# Patient Record
Sex: FEMALE | Race: WHITE | NOT HISPANIC OR LATINO | Employment: OTHER | URBAN - METROPOLITAN AREA
[De-identification: names, ages, dates, MRNs, and addresses within clinical notes are randomized per-mention and may not be internally consistent; named-entity substitution may affect disease eponyms.]

---

## 2017-10-18 ENCOUNTER — OFFICE VISIT (OUTPATIENT)
Dept: INTERNAL MEDICINE | Facility: MEDICAL CENTER | Age: 52
End: 2017-10-18
Payer: COMMERCIAL

## 2017-10-18 VITALS
HEIGHT: 70 IN | OXYGEN SATURATION: 96 % | BODY MASS INDEX: 30.24 KG/M2 | DIASTOLIC BLOOD PRESSURE: 88 MMHG | SYSTOLIC BLOOD PRESSURE: 136 MMHG | WEIGHT: 211.25 LBS | HEART RATE: 89 BPM | TEMPERATURE: 97.5 F

## 2017-10-18 DIAGNOSIS — R06.02 SOB (SHORTNESS OF BREATH): ICD-10-CM

## 2017-10-18 DIAGNOSIS — G62.9 NEUROPATHY: ICD-10-CM

## 2017-10-18 DIAGNOSIS — R56.9 SEIZURE (HCC): ICD-10-CM

## 2017-10-18 DIAGNOSIS — E66.9 OBESITY (BMI 30.0-34.9): ICD-10-CM

## 2017-10-18 DIAGNOSIS — Z72.0 TOBACCO USE: ICD-10-CM

## 2017-10-18 DIAGNOSIS — G89.29 CHRONIC LEFT-SIDED LOW BACK PAIN WITHOUT SCIATICA: ICD-10-CM

## 2017-10-18 DIAGNOSIS — Z87.898 HISTORY OF PREDIABETES: ICD-10-CM

## 2017-10-18 DIAGNOSIS — M54.2 NECK PAIN: ICD-10-CM

## 2017-10-18 DIAGNOSIS — M54.50 CHRONIC LEFT-SIDED LOW BACK PAIN WITHOUT SCIATICA: ICD-10-CM

## 2017-10-18 DIAGNOSIS — R05.9 COUGH: ICD-10-CM

## 2017-10-18 DIAGNOSIS — J45.30 MILD PERSISTENT ASTHMA WITHOUT COMPLICATION: ICD-10-CM

## 2017-10-18 DIAGNOSIS — Z00.00 HEALTHCARE MAINTENANCE: ICD-10-CM

## 2017-10-18 DIAGNOSIS — F31.9 BIPOLAR AFFECTIVE DISORDER, REMISSION STATUS UNSPECIFIED (HCC): ICD-10-CM

## 2017-10-18 DIAGNOSIS — I82.591 CHRONIC DEEP VEIN THROMBOSIS (DVT) OF OTHER VEIN OF RIGHT LOWER EXTREMITY (HCC): ICD-10-CM

## 2017-10-18 DIAGNOSIS — F43.10 PTSD (POST-TRAUMATIC STRESS DISORDER): ICD-10-CM

## 2017-10-18 PROBLEM — E66.811 OBESITY (BMI 30.0-34.9): Status: ACTIVE | Noted: 2017-10-18

## 2017-10-18 PROBLEM — I82.501 CHRONIC DEEP VEIN THROMBOSIS (DVT) OF RIGHT LOWER EXTREMITY (HCC): Status: ACTIVE | Noted: 2017-10-18

## 2017-10-18 LAB
HBA1C MFR BLD: 5.4 % (ref ?–5.8)
INT CON NEG: NEGATIVE
INT CON POS: POSITIVE

## 2017-10-18 PROCEDURE — 99999 POCT A1C: CPT | Mod: GC | Performed by: INTERNAL MEDICINE

## 2017-10-18 PROCEDURE — 83036 HEMOGLOBIN GLYCOSYLATED A1C: CPT | Performed by: INTERNAL MEDICINE

## 2017-10-18 PROCEDURE — 99204 OFFICE O/P NEW MOD 45 MIN: CPT | Mod: GC | Performed by: INTERNAL MEDICINE

## 2017-10-18 RX ORDER — NICOTINE 21 MG/24HR
1 PATCH, TRANSDERMAL 24 HOURS TRANSDERMAL EVERY 24 HOURS
Qty: 30 PATCH | Refills: 3 | Status: SHIPPED | OUTPATIENT
Start: 2017-10-18 | End: 2017-10-18 | Stop reason: SDUPTHER

## 2017-10-18 RX ORDER — GABAPENTIN 600 MG/1
800 TABLET ORAL 3 TIMES DAILY
Qty: 90 TAB | Refills: 6 | Status: SHIPPED | OUTPATIENT
Start: 2017-10-18 | End: 2020-08-04

## 2017-10-18 RX ORDER — LAMOTRIGINE 200 MG/1
200 TABLET ORAL DAILY
Qty: 30 TAB | Refills: 6 | Status: SHIPPED | OUTPATIENT
Start: 2017-10-18 | End: 2020-08-04

## 2017-10-18 RX ORDER — ASPIRIN 81 MG/1
81 TABLET, CHEWABLE ORAL DAILY
Qty: 100 TAB | Refills: 6 | Status: SHIPPED | OUTPATIENT
Start: 2017-10-18 | End: 2020-08-04

## 2017-10-18 RX ORDER — ALBUTEROL SULFATE 90 UG/1
2 AEROSOL, METERED RESPIRATORY (INHALATION) EVERY 6 HOURS PRN
Qty: 8.5 G | Refills: 6 | Status: SHIPPED | OUTPATIENT
Start: 2017-10-18

## 2017-10-18 RX ORDER — BACLOFEN 10 MG/1
10 TABLET ORAL 3 TIMES DAILY
Qty: 90 TAB | Refills: 3 | Status: SHIPPED | OUTPATIENT
Start: 2017-10-18 | End: 2018-03-05 | Stop reason: SDUPTHER

## 2017-10-18 RX ORDER — NICOTINE 21 MG/24HR
1 PATCH, TRANSDERMAL 24 HOURS TRANSDERMAL EVERY 24 HOURS
Qty: 30 PATCH | Refills: 3 | Status: SHIPPED | OUTPATIENT
Start: 2017-10-18 | End: 2020-08-04

## 2017-10-18 ASSESSMENT — PATIENT HEALTH QUESTIONNAIRE - PHQ9
SUM OF ALL RESPONSES TO PHQ QUESTIONS 1-9: 8
CLINICAL INTERPRETATION OF PHQ2 SCORE: 1
5. POOR APPETITE OR OVEREATING: 0 - NOT AT ALL

## 2017-10-18 NOTE — PATIENT INSTRUCTIONS
Post-traumatic Stress  You have post-traumatic stress disorder (PTSD). This condition causes many different symptoms including: emotional outbursts, anxiety, sleeping problems, social withdrawal, and drug abuse. PTSD often follows a particularly traumatic event such as war, or natural disasters like hurricanes, earthquakes, or floods. It can also be seen after personal traumas such as accidents, rape, or the death of someone you love. Symptoms may be delayed for days or even years. Emotional numbing and the inability to feel your emotions, may be the earliest sign. Periods of agitation, aggression, and inability to perform ordinary tasks are common with PTSD.  Nightmares and daytime memories of the trauma often bring on uncontrolled symptoms. Sufferers typically startle easily and avoid reminders of the trauma. Panic attacks, feelings of extreme guilt, and blackouts are often reported. Treatment is very helpful, especially group therapy. Healing happens when emotional traumas are shared with others who have a sympathetic ear. The [facility] have helped over 185,000 veterans with this problem. Medication is also very effective. The symptoms can become chronic and lifelong, so it is important to get help. Call your caregiver or a counselor who deals with this type of problem for further assistance.  Document Released: 01/25/2006 Document Revised: 03/11/2013 Document Reviewed: 12/18/2006  [employer]® Patient Information ©2014 [employer].
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
## 2017-10-18 NOTE — LETTER
[facility]  Nabeel Lees M.D.  [address]  [place] [address]  Fax: [phone]   Authorization for Release/Disclosure of   Protected Health Information   Name: DALIATRAM GÓMEZ SHANTAON ARIA : 1965 SSN: [id number]   Address: [address] [place] [address] [address] [place] [address] Phone:    [phone] (home)    I authorize the entity listed below to release/disclose the PHI below to:   [facility]/Nabeel Lees M.D. and Nabeel Lees M.D.   Provider or Entity Name: [facility]     Address   City, State, Zip   Phone:[phone]      Fax:[phone]     Reason for request: continuity of care   Information to be released: Last 3 Lab Results   [  ] LAST COLONOSCOPY,  including any PATH REPORT and follow-up  [  ] LAST FIT/COLOGUARD RESULT [  ] LAST DEXA  [  ] LAST MAMMOGRAM  [  ] LAST PAP  [  ] LAST LABS [  ] RETINA EXAM REPORT  [  ] IMMUNIZATION RECORDS  [  ] Release all info      [  ] Check here and initial the line next to each item to release ALL health information INCLUDING  _____ Care and treatment for drug and / or alcohol abuse  _____ HIV testing, infection status, or AIDS  _____ Genetic Testing    DATES OF SERVICE OR TIME PERIOD TO BE DISCLOSED: _____________  I understand and acknowledge that:  * This Authorization may be revoked at any time by you in writing, except if your health information has already been used or disclosed.  * Your health information that will be used or disclosed as a result of you signing this authorization could be re-disclosed by the recipient. If this occurs, your re-disclosed health information may no longer be protected by State or Federal laws.  * You may refuse to sign this Authorization. Your refusal will not affect your ability to obtain treatment.  * This Authorization becomes effective upon signing and will  on (date) __________.      If no date is indicated, this Authorization will  one (1) year from the signature date.    Name: Dalia Smith  Yuliya    Signature:   Date:     10/18/2017       PLEASE FAX REQUESTED RECORDS BACK TO: [phone]

## 2017-10-30 ENCOUNTER — APPOINTMENT (OUTPATIENT)
Dept: INTERNAL MEDICINE | Facility: MEDICAL CENTER | Age: 52
End: 2017-10-30
Payer: COMMERCIAL

## 2017-11-16 ENCOUNTER — HOSPITAL ENCOUNTER (OUTPATIENT)
Dept: RADIOLOGY | Facility: MEDICAL CENTER | Age: 52
End: 2017-11-16
Attending: INTERNAL MEDICINE
Payer: COMMERCIAL

## 2017-11-16 PROCEDURE — G0202 SCR MAMMO BI INCL CAD: HCPCS

## 2017-11-20 NOTE — PROGRESS NOTES
Pt's mammogram on 11/16/2017 showed No gross evidence of malignancy and no interval change.    Thanks

## 2017-11-27 ENCOUNTER — TELEPHONE (OUTPATIENT)
Dept: INTERNAL MEDICINE | Facility: MEDICAL CENTER | Age: 52
End: 2017-11-27

## 2017-11-28 NOTE — TELEPHONE ENCOUNTER
Called Dr. Celaya's office to make sure of this information the patient gave. Spoke to someone in the  I think by the name of brittaney and she let me know they do take [place] but their panel is full and can not take any new patient's at this time. Patient will need a new referral placed to go somewhere else

## 2017-11-28 NOTE — TELEPHONE ENCOUNTER
----- Message from Tasia Shaikh, Med Ass't sent at 11/27/2017  3:31 PM PST -----  Regarding: Dr. Lees-new referral  Contact: [phone]  Dr. Celaya does not take her insurance, so can we redo the referral-Thanks Mary

## 2017-11-30 ENCOUNTER — TELEPHONE (OUTPATIENT)
Dept: HOSPITALIST | Facility: MEDICAL CENTER | Age: 52
End: 2017-11-30

## 2017-11-30 DIAGNOSIS — E66.9 OBESITY (BMI 30.0-34.9): ICD-10-CM

## 2017-11-30 DIAGNOSIS — F31.9 BIPOLAR AFFECTIVE DISORDER, REMISSION STATUS UNSPECIFIED (HCC): ICD-10-CM

## 2018-01-29 ENCOUNTER — OFFICE VISIT (OUTPATIENT)
Dept: INTERNAL MEDICINE | Facility: MEDICAL CENTER | Age: 53
End: 2018-01-29
Payer: COMMERCIAL

## 2018-01-29 VITALS
BODY MASS INDEX: 31.25 KG/M2 | WEIGHT: 218.25 LBS | OXYGEN SATURATION: 96 % | SYSTOLIC BLOOD PRESSURE: 138 MMHG | HEIGHT: 70 IN | DIASTOLIC BLOOD PRESSURE: 92 MMHG | HEART RATE: 79 BPM | TEMPERATURE: 97.7 F

## 2018-01-29 DIAGNOSIS — Z12.4 CERVICAL CANCER SCREENING: ICD-10-CM

## 2018-01-29 PROCEDURE — 99000 SPECIMEN HANDLING OFFICE-LAB: CPT | Performed by: INTERNAL MEDICINE

## 2018-01-29 NOTE — PATIENT INSTRUCTIONS
Pap Test  A Pap test checks the cells on the surface of your cervix. Your doctor will look for cell changes that are not normal, an infection, or cancer. If the cells no longer look normal, it is called dysplasia. Dysplasia can turn into cancer. Regular Pap tests are important to stop cancer from developing.  BEFORE THE PROCEDURE  · Ask your doctor when to schedule your Pap test. Timing the test around your period may be important.  · Do not douche or have sex (intercourse) for 24 hours before the test.  · Do not put creams on your vagina or use tampons for 24 hours before the test.  · Go pee (urinate) just before the test.  PROCEDURE  · You will lie on an exam table with your feet in stirrups.  · A warm metal or plastic tool (speculum) will be put in your vagina to open it up.  · Your doctor will use a small, plastic brush or wooden spatula to take cells from your cervix.  · The cells will be put in a lab container.  · The cells will be checked under a microscope to see if they are normal or not.  AFTER THE PROCEDURE  Get your test results. If they are abnormal, you may need more tests.  Document Released: 01/20/2012 Document Revised: 03/11/2013 Document Reviewed: 12/13/2012  [employer]® Patient Information ©2014 [employer].

## 2018-01-29 NOTE — PROGRESS NOTES
Established Patient    Wally presents today with the following:    CC: is here for Pap smear    HPI:   Ms. Sarah Dwyer is a 53 yo F with PMHx of PTSD, Bipolar, depression, Seizure, DVT, chronic back and neck pain, peripheral neuropathy, bronchial asthma and prediabetes is here for Pap smear procedure. Pt reported that she has done Pap smear 3 yr ago and was normal. Pt denies any vaginal discharge, bleeding,  itching, smelling, valvular skin rash, swelling, fever or chills. Pt denies any hx of cervical disorders. Pt underwent pap smear procedure today under supervision of Dr. Cannon. Procedure went well and w/o any acute complications. Cervical region was visualized, no polyps, mass, discharge or bleeding was noticed. Sample sent to the lab with thin prep with HPV reflex to genotype  Pt had Mammogram 11/2017 and result was no gross evidence of malignancy and recommend to have screening mammogram in one year     Patient Active Problem List    Diagnosis Date Noted   • Cervical cancer screening 01/29/2018   • Mild persistent asthma without complication 10/18/2017   • PTSD (post-traumatic stress disorder) 10/18/2017   • Neuropathy (CMS-HCC) 10/18/2017   • Chronic deep vein thrombosis (DVT) of right lower extremity (CMS-HCC) 10/18/2017   • Tobacco use 10/18/2017   • Obesity (BMI 30.0-34.9) 10/18/2017   • Healthcare maintenance 10/18/2017   • History of prediabetes 10/18/2017   • Seizure (CMS-HCC) 10/18/2017   • Neck pain 11/16/2012   • Back pain 11/16/2012   • Bipolar affective (CMS-HCC) 11/16/2012       Current Outpatient Prescriptions   Medication Sig Dispense Refill   • Multiple Vitamins-Minerals (MULTIVITAMIN ADULTS PO) Take  by mouth.     • Omega-3 Fatty Acids (FISH OIL PO) Take  by mouth.     • Probiotic Product (PROBIOTIC PO) Take  by mouth.     • RASPBERRY KETONES PO Take  by mouth.     • nicotine (NICODERM) 14 MG/24HR PATCH 24 HR Apply 1 Patch to skin as directed every 24 hours. 30 Patch 3   • aspirin (ASA) 81  MG Chew Tab chewable tablet Take 1 Tab by mouth every day. 100 Tab 6   • gabapentin (NEURONTIN) 600 MG tablet Take 1.5 Tabs by mouth 3 times a day. 90 Tab 6   • lamotrigine (LAMICTAL) 200 MG tablet Take 1 Tab by mouth every day. 30 Tab 6   • baclofen (LIORESAL) 10 MG Tab Take 1 Tab by mouth 3 times a day. 90 Tab 3   • albuterol 108 (90 Base) MCG/ACT Aero Soln inhalation aerosol Inhale 2 Puffs by mouth every 6 hours as needed for Shortness of Breath. 8.5 g 6     No current facility-administered medications for this visit.        ROS: As per HPI. Otherwise unremarkable     Past Medical History:   Diagnosis Date   • Arthritis    • ASTHMA    • Back pain 11/16/2012   • Blood clot in vein    • Brain injury (CMS-HCC)    • Bronchitis    • DVT (deep venous thrombosis) (CMS-HCC) 8/7/2010   • GERD (gastroesophageal reflux disease)    • Hypertension    • Neck pain    • PE (pulmonary embolism) 8/7/2010   • Psychiatric disorder     bipolar   • PTSD (post-traumatic stress disorder)    • Seizure (CMS-HCC) 10/18/2017     Past Surgical History:   Procedure Laterality Date   • GYN SURGERY      tubal ligation   • PRIMARY C SECTION     • TUBAL COAGULATION LAPAROSCOPIC BILATERAL       Family History   Problem Relation Age of Onset   • Hyperlipidemia Mother    • Hypertension Mother    • Diabetes Mother    • Heart Disease Mother    • Diabetes Father    • Hypertension Father    • Heart Disease Father    • DVT Father    • Diabetes Sister    • Other Sister      PTSD   • Lung Disease Neg Hx    • Cancer Neg Hx    • Stroke Neg Hx      Social History     Social History   • Marital status:      Spouse name: N/A   • Number of children: N/A   • Years of education: N/A     Occupational History   • Not on file.     Social History Main Topics   • Smoking status: Current Every Day Smoker     Packs/day: 1.00     Years: 20.00     Types: Cigarettes   • Smokeless tobacco: Never Used   • Alcohol use No   • Drug use: No   • Sexual activity: Yes      "Partners: Male     Other Topics Concern   • Not on file     Social History Narrative   • No narrative on file         /92   Pulse 79   Temp 36.5 °C (97.7 °F)   Ht 1.772 m (5' 9.75\")   Wt 99 kg (218 lb 4 oz)   SpO2 96%   BMI 31.54 kg/m²     Physical Exam   Constitutional:  oriented to person, place, and time. No distress.   Eyes: Pupils are equal, round, and reactive to light. No scleral icterus.  Neck: Neck supple. No thyromegaly present.   Cardiovascular: Normal rate, regular rhythm and normal heart sounds.  Exam reveals no gallop and no friction rub.  No murmur heard.  Pulmonary/Chest: Breath sounds normal. Chest wall is not dull to percussion.   Musculoskeletal:   no edema.   Lymphadenopathy: no cervical adenopathy  Neurological: alert and oriented to person, place, and time.   Skin: No rash        Assessment and Plan    Cervical cancer screening  -underwent Pap smear procedure today under supervision of Dr. Cannon. Procedure went well and w/o any acute complications  -Sample sent to the lab with thin prep with HPV reflex to genotype     Healthcare maintenance  Flu - refuse again, reported get side effect such as fever, myalgia, nausea.   TD- reported received 5 yr ago  Colonoscopy - 10/2011 for abdominal pain, Dx: Lt sided ischemic colitis. Decline to have colonoscopy as well as FIT test. Pt also had upper endoscopy in 10/2011 which showed mild gastric inflammation.   Pap -done today 1/29/18  Mammogram - done in  11/2017 and result was no gross evidence of malignancy and recommend to have screening mammogram in one year, 11/2018    RTC in 5 weeks to discuss other chronic problems      Signed by: Nabeel Lees M.D.          "

## 2018-02-01 ENCOUNTER — TELEPHONE (OUTPATIENT)
Dept: INTERNAL MEDICINE | Facility: MEDICAL CENTER | Age: 53
End: 2018-02-01

## 2018-02-01 DIAGNOSIS — Z12.4 CERVICAL CANCER SCREENING: ICD-10-CM

## 2018-02-08 ENCOUNTER — HOSPITAL ENCOUNTER (EMERGENCY)
Facility: MEDICAL CENTER | Age: 53
End: 2018-02-08
Attending: EMERGENCY MEDICINE
Payer: COMMERCIAL

## 2018-02-08 VITALS
HEART RATE: 75 BPM | TEMPERATURE: 97.9 F | OXYGEN SATURATION: 95 % | DIASTOLIC BLOOD PRESSURE: 75 MMHG | HEIGHT: 70 IN | SYSTOLIC BLOOD PRESSURE: 144 MMHG | RESPIRATION RATE: 16 BRPM

## 2018-02-08 DIAGNOSIS — Z77.098 CHEMICAL EXPOSURE: ICD-10-CM

## 2018-02-08 DIAGNOSIS — R09.81 NASAL CONGESTION: ICD-10-CM

## 2018-02-08 DIAGNOSIS — L29.9 ITCHING: ICD-10-CM

## 2018-02-08 PROCEDURE — 99283 EMERGENCY DEPT VISIT LOW MDM: CPT

## 2018-02-08 RX ORDER — METHYLPREDNISOLONE 4 MG/1
TABLET ORAL
Qty: 21 TAB | Refills: 0 | Status: SHIPPED | OUTPATIENT
Start: 2018-02-08 | End: 2019-12-03

## 2018-02-08 NOTE — ED PROVIDER NOTES
"ED Provider Note    Scribed for Warren Yang M.D. by Markel Marquez. 2/8/2018  7:46 AM    Primary care provider: Nabeel Lees M.D.  Means of arrival: Walk in  History obtained from: Patient  History limited by: None    CHIEF COMPLAINT  Chief Complaint   Patient presents with   • Sore Throat     place she is staying is spraying for bugs and is bothering pts throat    • Cough     same spray is making her cough    • Head Ache   • Bug Bite     bed bugs where pt is staying        HPI  Wally Dwyer is a 53 y.o. female who presents to the Emergency Department with multiple complaints. Patient states her home has been sprayed with \"chemicals\" for bed bugs. Since then, she has been having dry skin and itchiness believed to be from bed bugs. She has been using hypoallergenic skin lotion. Patient's daughter is also in the ED with similar skin symptoms.    Patient also reports productive cough with white milky sputum production, abdominal cramping, and chronic migraine headaches which have been causing difficulty sleeping over the last 3-4 days. She does not report any recent fevers. Patient mentions having a history of kidney disease and neuropathy. She is borderline diabetic.      REVIEW OF SYSTEMS  Pertinent positives include dry skin, itchy skin, cough, sputum production, abdominal cramping, migraine headaches, difficulty sleeping.   Pertinent negatives include no recent fevers.    E      PAST MEDICAL HISTORY   has a past medical history of Arthritis; ASTHMA; Back pain (11/16/2012); Blood clot in vein; Brain injury (CMS-HCC); Bronchitis; DVT (deep venous thrombosis) (CMS-HCC) (8/7/2010); GERD (gastroesophageal reflux disease); Hypertension; Neck pain; PE (pulmonary embolism) (8/7/2010); Psychiatric disorder; PTSD (post-traumatic stress disorder); and Seizure (CMS-HCC) (10/18/2017).    SURGICAL HISTORY   has a past surgical history that includes gyn surgery; tubal coagulation laparoscopic bilateral; " and primary c section.    SOCIAL HISTORY  Social History   Substance Use Topics   • Smoking status: Current Every Day Smoker     Packs/day: 1.00     Years: 20.00     Types: Cigarettes   • Smokeless tobacco: Never Used   • Alcohol use No      History   Drug Use No       FAMILY HISTORY  Family History   Problem Relation Age of Onset   • Hyperlipidemia Mother    • Hypertension Mother    • Diabetes Mother    • Heart Disease Mother    • Diabetes Father    • Hypertension Father    • Heart Disease Father    • DVT Father    • Diabetes Sister    • Other Sister      PTSD   • Lung Disease Neg Hx    • Cancer Neg Hx    • Stroke Neg Hx        CURRENT MEDICATIONS  No current facility-administered medications on file prior to encounter.      Current Outpatient Prescriptions on File Prior to Encounter   Medication Sig Dispense Refill   • Multiple Vitamins-Minerals (MULTIVITAMIN ADULTS PO) Take  by mouth.     • Omega-3 Fatty Acids (FISH OIL PO) Take  by mouth.     • Probiotic Product (PROBIOTIC PO) Take  by mouth.     • RASPBERRY KETONES PO Take  by mouth.     • nicotine (NICODERM) 14 MG/24HR PATCH 24 HR Apply 1 Patch to skin as directed every 24 hours. 30 Patch 3   • aspirin (ASA) 81 MG Chew Tab chewable tablet Take 1 Tab by mouth every day. 100 Tab 6   • gabapentin (NEURONTIN) 600 MG tablet Take 1.5 Tabs by mouth 3 times a day. 90 Tab 6   • lamotrigine (LAMICTAL) 200 MG tablet Take 1 Tab by mouth every day. 30 Tab 6   • baclofen (LIORESAL) 10 MG Tab Take 1 Tab by mouth 3 times a day. 90 Tab 3   • albuterol 108 (90 Base) MCG/ACT Aero Soln inhalation aerosol Inhale 2 Puffs by mouth every 6 hours as needed for Shortness of Breath. 8.5 g 6      ALLERGIES  Allergies   Allergen Reactions   • Haldol [Haloperidol Lactate] Anaphylaxis   • Abilify Shortness of Breath and Swelling   • Bee    • Cogentin [Benztropine Mesylate] Shortness of Breath   • Latuda [Lurasidone Hcl]        PHYSICAL EXAM  VITAL SIGNS: /75   Pulse 75   Temp 36.6  "°C (97.9 °F)   Resp 16   Ht 1.778 m (5' 10\")   SpO2 95%   Nursing note and vitals reviewed.  Constitutional: Well-developed and well-nourished. No distress.   HENT: Head is normocephalic and atraumatic. Oropharynx is clear and moist without exudate or erythema. Clear rhinorrhea and nasal congestion.  Eyes: Pupils are equal, round, and reactive to light. Conjunctiva are normal.   Cardiovascular: Normal rate and regular rhythm. No murmur heard. Normal radial pulses.  Pulmonary/Chest: Breath sounds normal. No wheezes or rales.   Abdominal: Soft and non-tender. No distention    Musculoskeletal: Extremities exhibit normal range of motion without edema or tenderness.   Neurological: Awake, alert and oriented to person, place, and time. No focal deficits noted.  Skin: Skin is warm and dry. No rash.   Psychiatric: Normal mood and affect. Appropriate for clinical situation      COURSE & MEDICAL DECISION MAKING  Nursing notes, VS, PMSFHx reviewed in chart.     7:46 AM - Patient seen and examined at bedside. Informed patient her symptoms are likely due to the recent chemical spraying in her home. The patient will be discharged with instructions regarding supportive care and medications including medrol. Discussed indications for seeking immediate medical attention. Patient was given the opportunity for questions. The patient understands and agrees.        The patient was discharged home with an information sheet on chemical inhalation and told to return immediately for any signs or symptoms listed.  The patient agreed to the discharge precautions and follow-up plan which is documented in EPIC.      The patient will return for new or worsening symptoms and is stable at the time of discharge.    The patient is referred to a primary physician for blood pressure management, diabetic screening, and for all other preventative health concerns.      DISPOSITION:  Patient will be discharged home in stable condition.    FOLLOW " UP:  [facility], Emergency Dept  [address]  [place] [address]  [phone]    If symptoms worsen    Nabeel Lees M.D.  [address]  [facility] [address]  [place] [address]  [phone]    Schedule an appointment as soon as possible for a visit        OUTPATIENT MEDICATIONS:  Discharge Medication List as of 2/8/2018  8:01 AM      START taking these medications    Details   methylPREDNISolone (MEDROL) 4 MG Tab Take as per the package instructions., Disp-21 Tab, R-0, Normal              FINAL IMPRESSION  1. Nasal congestion    2. Itching    3. Chemical exposure          IMarkel (Scribe), am scribing for, and in the presence of, Warren Yang M.D..    Electronically signed by: Markel Marquez (Scribe), 2/8/2018    IWarren M.D. personally performed the services described in this documentation, as scribed by Markel Marquez in my presence, and it is both accurate and complete.    The note accurately reflects work and decisions made by me.  Warren Yang  2/8/2018  11:56 AM

## 2018-02-08 NOTE — DISCHARGE INSTRUCTIONS
Chemical Inhalation  Your caregiver has diagnosed you as suffering from chemical inhalation. Inhaling chemical gas is dangerous. It causes an irritation of the linings of the breathing tubes within the lungs. If this irritation or reaction to the chemical is bad, it can cause difficulty breathing.  Do not return to the area of the chemical exposure until authorities tell you it is safe.  Chemical inhalation is often treated with observation. Observation may often be carried out at home. If the reaction has been severe, hospitalization may be required. Sometimes anti-inflammatory medicines are needed. These are medicines which decrease the inflammation in the lungs. If hospitalization is required, you will need to remain in the hospital until your breathing is close to normal and it is safe for you to go home.  SEEK IMMEDIATE MEDICAL CARE IF:   · You have a fever.   · You have wheezing, difficulty breathing, continuous cough, nausea, or vomiting.   · You have shortness of breath with your usual activities, or your heart seems to beat too fast with minimal exercise.   · You become confused, irritable, or unusually sleepy. Have someone drive you to the emergency department or call 911. Do not drive yourself. A re-check will determine if hospitalization is needed for closer observation or treatment.   Document Released: 09/12/2002 Document Revised: 03/11/2013 Document Reviewed: 04/20/2009  [employer]® Patient Information ©2013 [employer].

## 2018-02-08 NOTE — ED NOTES
Pt able to sign discharge papers and teach back medication use of prednisone. Pt in no distress, AAOx4, steady gait to waiting room.

## 2018-02-08 NOTE — ED NOTES
"Adult female pt is escorted to room 58 and asked to change into a gown for an evaluation from the doctor.  The patient refuses and states, \"I don't like getting undressed in places I'm not familiar with\".  I inform her that is it standard for patients to remove their top garments, leave the bottoms on to assist and expedite the exam process. Pt is angry and talking over me constantly.    "

## 2018-02-08 NOTE — ED TRIAGE NOTES
"Triage notes     Pt c/o cough, sore throat and headache from the spay there are using at the house she is staying at.  States they have bed bugs at it is being sprayed and she is worried that the spray is causing all of this. Pt dose have bug bites.     .  Chief Complaint   Patient presents with   • Sore Throat     place she is staying is spraying for bugs and is bothering pts throat    • Cough     same spray is making her cough    • Head Ache   • Bug Bite     bed bugs where pt is staying      .Pulse 84   Temp 36.6 °C (97.9 °F)   Resp 17   Ht 1.778 m (5' 10\")   SpO2 93%     "

## 2018-02-08 NOTE — ED NOTES
"Attempting to escort patient to room 58 and she is rummaging through her suitcase and asking the registrar to help her put a nicotine patch on, talking over me while I ask her her name and if she is ready to be evaluated.  Pt states, \"you know I can't walk because I have neuropathy and asthma\".  Pt is seen walking carrying two bags and suitcase also stating, \"i'm gonna leave to go have a smoke now\".  I inform her that her room is ready and we are ready to evaluate her and her daughter now.  Pts daughter is compliant and goes to room 58.  Adult female patient is not following directions and goes to the water [place] and refuses to follow me.    "

## 2018-02-08 NOTE — ED NOTES
Pt would not sit still for b/p and as it tightened she ripped it off and refused to have it taken.

## 2018-02-09 ENCOUNTER — PATIENT OUTREACH (OUTPATIENT)
Dept: HEALTH INFORMATION MANAGEMENT | Facility: OTHER | Age: 53
End: 2018-02-09

## 2018-02-09 NOTE — LETTER
Wally Dywer  [address]  [place] [address]    February 9, 2018      Dear Wally Dwyer,    [facility] wants to ensure your discharge home is safe and you or your loved ones have had all of your questions answered regarding your care after you leave the hospital.    Our discharge team was unsuccessful in our attempts to contact you telephonically and we wanted to be sure that you had a list of resources and contact information should you have any questions regarding your hospital stay, follow-up instructions, or active medical symptoms.    Questions or Concerns Regarding… Contact   Medical Questions Related to Your Discharge  (7 days a week, 8am-5pm) Contact a Nurse Care Coordinator   [phone]   Medical Questions Not Related to Your Discharge  (24 hours a day / 7 days a week)  Contact the Nurse Health Line   [phone]    Medications or Discharge Instructions Refer to your discharge packet   or contact your -[phone]   Follow-up Appointment(s) Schedule your appointment via [employer]   or contact Scheduling [phone]   Billing Review your statement via [employer]  or contact Billing [phone]   Medical Records Review your records via [employer]   or contact Medical Records [phone]     You can also easily access your medical information, test results and upcoming appointments via the [employer] free online health management tool. You can learn more and sign up at [employer]. For assistance setting up your [employer] account, please call [phone].    Once again, we want to ensure your discharge home is safe and that you have a clear understanding of any next steps in your care. If you have any questions or concerns, please do not hesitate to contact us, we are here for you. Thank you for choosing [facility] for your healthcare needs.    Sincerely,      Your [facility] Healthcare Team

## 2018-02-22 ENCOUNTER — TELEPHONE (OUTPATIENT)
Dept: INTERNAL MEDICINE | Facility: MEDICAL CENTER | Age: 53
End: 2018-02-22

## 2018-02-23 ENCOUNTER — TELEPHONE (OUTPATIENT)
Dept: INTERNAL MEDICINE | Facility: MEDICAL CENTER | Age: 53
End: 2018-02-23

## 2018-02-23 NOTE — TELEPHONE ENCOUNTER
1. Caller Name: Wally Clauson                      Call Back Number: [phone] (home)       2. Message: Patient requesting pap smear results.    3. Patient approves office to leave a detailed voicemail/MyChart message: yes

## 2018-02-23 NOTE — TELEPHONE ENCOUNTER
Called pt's phone and could not reach her. Pt asked about her pap smear result. It is in the media and report is -ve for intraepithelial lesion or  Malignancy. Also HPV is not detected on 1/2018    Thank you

## 2018-03-05 DIAGNOSIS — M54.2 NECK PAIN: ICD-10-CM

## 2018-03-05 RX ORDER — BACLOFEN 10 MG/1
10 TABLET ORAL 3 TIMES DAILY
Qty: 90 TAB | Refills: 3 | Status: SHIPPED | OUTPATIENT
Start: 2018-03-05 | End: 2020-08-04

## 2018-05-30 ENCOUNTER — HOSPITAL ENCOUNTER (OUTPATIENT)
Dept: RADIOLOGY | Facility: MEDICAL CENTER | Age: 53
End: 2018-05-30
Attending: NURSE PRACTITIONER
Payer: COMMERCIAL

## 2018-05-30 VITALS
TEMPERATURE: 97.5 F | WEIGHT: 210 LBS | DIASTOLIC BLOOD PRESSURE: 64 MMHG | HEART RATE: 62 BPM | HEIGHT: 70 IN | SYSTOLIC BLOOD PRESSURE: 132 MMHG | RESPIRATION RATE: 16 BRPM | OXYGEN SATURATION: 95 % | BODY MASS INDEX: 30.06 KG/M2

## 2018-05-30 DIAGNOSIS — M54.5 BILATERAL LOW BACK PAIN, UNSPECIFIED CHRONICITY, WITH SCIATICA PRESENCE UNSPECIFIED: ICD-10-CM

## 2018-05-30 PROCEDURE — 72148 MRI LUMBAR SPINE W/O DYE: CPT

## 2018-05-30 ASSESSMENT — PAIN SCALES - GENERAL
PAINLEVEL_OUTOF10: 0

## 2018-05-30 NOTE — DISCHARGE INSTRUCTIONS
MRI ADULT DISCHARGE INSTRUCTIONS    You have been medicated today for your scan. Please follow the instructions below to ensure your safe recovery. If you have any questions or problems, feel free to call us at [phone] or [phone].     1.   Have someone stay with you to assist you as needed.    2.   Do not drive or operate any mechanical devices.    3.   Do not perform any activity that requires concentration. Make no major decisions over the next 24 hours.     4.   Be careful changing positions from laying down to sitting or standing, as you may become dizzy.     5.   Do not drink alcohol for 48 hours.    6.   There are no restrictions for eating your normal meals. Drink fluids.    7.   You may continue your usual medications for pain, tranquilizers, muscle relaxants or sedatives when awake.     8.   Tomorrow, you may continue your normal daily activities.     9.   Pressure dressing on 10 - 15 minutes. If swelling or bleeding occurs when removed, continue placing direct pressure on injection site for another 5 minutes, or until bleeding stops.     I have been informed of and understand the above discharge instructions.

## 2019-11-24 ENCOUNTER — HOSPITAL ENCOUNTER (EMERGENCY)
Facility: MEDICAL CENTER | Age: 54
End: 2019-11-24
Payer: COMMERCIAL

## 2019-11-24 VITALS
DIASTOLIC BLOOD PRESSURE: 92 MMHG | HEIGHT: 70 IN | WEIGHT: 215.83 LBS | SYSTOLIC BLOOD PRESSURE: 149 MMHG | HEART RATE: 85 BPM | RESPIRATION RATE: 16 BRPM | TEMPERATURE: 96.8 F | BODY MASS INDEX: 30.9 KG/M2

## 2019-11-24 PROCEDURE — 302449 STATCHG TRIAGE ONLY (STATISTIC)

## 2019-11-25 NOTE — ED TRIAGE NOTES
"Pt bib ems. Pt c/o body aches related to neighbors extreme highly toxic fumes from meth and from mice feces. Pt with bipolar, no longer takes medications states \"killing my kidneys\". Pt also spraying for venomous spiders.   "

## 2019-11-25 NOTE — ED NOTES
Attempted to call patient for blood draw and no answer in main lobby or senior lounge. Will call again in 10 mins.

## 2019-11-25 NOTE — ED NOTES
Patient came up to triage to sign out AMA. Sign formed. Refused to let me take wrist band off and stormed out of the ER. Will d/c.

## 2019-11-28 ENCOUNTER — HOSPITAL ENCOUNTER (EMERGENCY)
Facility: MEDICAL CENTER | Age: 54
End: 2019-11-29
Attending: EMERGENCY MEDICINE
Payer: MEDICAID

## 2019-11-28 ENCOUNTER — APPOINTMENT (OUTPATIENT)
Dept: RADIOLOGY | Facility: MEDICAL CENTER | Age: 54
End: 2019-11-28
Attending: EMERGENCY MEDICINE
Payer: MEDICAID

## 2019-11-28 ENCOUNTER — HOSPITAL ENCOUNTER (OUTPATIENT)
Facility: MEDICAL CENTER | Age: 54
End: 2019-11-28
Attending: OBSTETRICS & GYNECOLOGY | Admitting: OBSTETRICS & GYNECOLOGY

## 2019-11-28 ENCOUNTER — HOSPITAL ENCOUNTER (EMERGENCY)
Facility: MEDICAL CENTER | Age: 54
End: 2019-11-28
Attending: EMERGENCY MEDICINE
Payer: MEDICAID

## 2019-11-28 VITALS
BODY MASS INDEX: 34.55 KG/M2 | RESPIRATION RATE: 16 BRPM | DIASTOLIC BLOOD PRESSURE: 71 MMHG | OXYGEN SATURATION: 100 % | TEMPERATURE: 98.1 F | HEIGHT: 66 IN | WEIGHT: 215 LBS | SYSTOLIC BLOOD PRESSURE: 132 MMHG | HEART RATE: 71 BPM

## 2019-11-28 DIAGNOSIS — F23 ACUTE PSYCHOSIS (HCC): ICD-10-CM

## 2019-11-28 DIAGNOSIS — J06.9 UPPER RESPIRATORY TRACT INFECTION, UNSPECIFIED TYPE: ICD-10-CM

## 2019-11-28 LAB
ANION GAP SERPL CALC-SCNC: 8 MMOL/L (ref 0–11.9)
BASOPHILS # BLD AUTO: 0.8 % (ref 0–1.8)
BASOPHILS # BLD: 0.08 K/UL (ref 0–0.12)
BUN SERPL-MCNC: 13 MG/DL (ref 8–22)
CALCIUM SERPL-MCNC: 9.1 MG/DL (ref 8.5–10.5)
CHLORIDE SERPL-SCNC: 105 MMOL/L (ref 96–112)
CO2 SERPL-SCNC: 27 MMOL/L (ref 20–33)
CREAT SERPL-MCNC: 0.58 MG/DL (ref 0.5–1.4)
EKG IMPRESSION: NORMAL
EOSINOPHIL # BLD AUTO: 0.1 K/UL (ref 0–0.51)
EOSINOPHIL NFR BLD: 0.9 % (ref 0–6.9)
ERYTHROCYTE [DISTWIDTH] IN BLOOD BY AUTOMATED COUNT: 45.6 FL (ref 35.9–50)
FLUAV RNA SPEC QL NAA+PROBE: NEGATIVE
FLUBV RNA SPEC QL NAA+PROBE: NEGATIVE
GLUCOSE SERPL-MCNC: 117 MG/DL (ref 65–99)
HCT VFR BLD AUTO: 41.5 % (ref 37–47)
HGB BLD-MCNC: 13.4 G/DL (ref 12–16)
IMM GRANULOCYTES # BLD AUTO: 0.03 K/UL (ref 0–0.11)
IMM GRANULOCYTES NFR BLD AUTO: 0.3 % (ref 0–0.9)
LYMPHOCYTES # BLD AUTO: 2.97 K/UL (ref 1–4.8)
LYMPHOCYTES NFR BLD: 28 % (ref 22–41)
MCH RBC QN AUTO: 29.8 PG (ref 27–33)
MCHC RBC AUTO-ENTMCNC: 32.3 G/DL (ref 33.6–35)
MCV RBC AUTO: 92.2 FL (ref 81.4–97.8)
MONOCYTES # BLD AUTO: 0.75 K/UL (ref 0–0.85)
MONOCYTES NFR BLD AUTO: 7.1 % (ref 0–13.4)
NEUTROPHILS # BLD AUTO: 6.66 K/UL (ref 2–7.15)
NEUTROPHILS NFR BLD: 62.9 % (ref 44–72)
NRBC # BLD AUTO: 0 K/UL
NRBC BLD-RTO: 0 /100 WBC
PLATELET # BLD AUTO: 293 K/UL (ref 164–446)
PMV BLD AUTO: 9.6 FL (ref 9–12.9)
POC BREATHALIZER: 0.02 PERCENT (ref 0–0.01)
POTASSIUM SERPL-SCNC: 3.5 MMOL/L (ref 3.6–5.5)
RBC # BLD AUTO: 4.5 M/UL (ref 4.2–5.4)
SODIUM SERPL-SCNC: 140 MMOL/L (ref 135–145)
WBC # BLD AUTO: 10.6 K/UL (ref 4.8–10.8)

## 2019-11-28 PROCEDURE — 99284 EMERGENCY DEPT VISIT MOD MDM: CPT

## 2019-11-28 PROCEDURE — 85025 COMPLETE CBC W/AUTO DIFF WBC: CPT

## 2019-11-28 PROCEDURE — 93005 ELECTROCARDIOGRAM TRACING: CPT | Performed by: EMERGENCY MEDICINE

## 2019-11-28 PROCEDURE — 99285 EMERGENCY DEPT VISIT HI MDM: CPT

## 2019-11-28 PROCEDURE — 71046 X-RAY EXAM CHEST 2 VIEWS: CPT

## 2019-11-28 PROCEDURE — 302970 POC BREATHALIZER: Performed by: EMERGENCY MEDICINE

## 2019-11-28 PROCEDURE — 700102 HCHG RX REV CODE 250 W/ 637 OVERRIDE(OP): Performed by: EMERGENCY MEDICINE

## 2019-11-28 PROCEDURE — 304561 HCHG STAT O2

## 2019-11-28 PROCEDURE — A9270 NON-COVERED ITEM OR SERVICE: HCPCS | Performed by: EMERGENCY MEDICINE

## 2019-11-28 PROCEDURE — 80048 BASIC METABOLIC PNL TOTAL CA: CPT

## 2019-11-28 PROCEDURE — 87502 INFLUENZA DNA AMP PROBE: CPT

## 2019-11-28 RX ORDER — GABAPENTIN 400 MG/1
800 CAPSULE ORAL ONCE
Status: COMPLETED | OUTPATIENT
Start: 2019-11-28 | End: 2019-11-28

## 2019-11-28 RX ORDER — RISPERIDONE 2 MG/1
2 TABLET ORAL 2 TIMES DAILY
Status: DISCONTINUED | OUTPATIENT
Start: 2019-11-28 | End: 2019-11-29

## 2019-11-28 RX ORDER — LORAZEPAM 1 MG/1
1 TABLET ORAL ONCE
Status: COMPLETED | OUTPATIENT
Start: 2019-11-28 | End: 2019-11-28

## 2019-11-28 RX ORDER — IBUPROFEN 600 MG/1
600 TABLET ORAL ONCE
Status: COMPLETED | OUTPATIENT
Start: 2019-11-28 | End: 2019-11-28

## 2019-11-28 RX ADMIN — RISPERIDONE 2 MG: 2 TABLET ORAL at 21:53

## 2019-11-28 RX ADMIN — LORAZEPAM 1 MG: 1 TABLET ORAL at 20:20

## 2019-11-28 RX ADMIN — GABAPENTIN 800 MG: 400 CAPSULE ORAL at 20:20

## 2019-11-28 RX ADMIN — IBUPROFEN 600 MG: 600 TABLET ORAL at 20:20

## 2019-11-28 ASSESSMENT — LIFESTYLE VARIABLES: DO YOU DRINK ALCOHOL: NO

## 2019-11-28 NOTE — ED NOTES
Pt resting quietly in bed with lights dimmed. Respirations even and unlabored. Skin warm and dry. Awaiting flu test results.

## 2019-11-28 NOTE — ED NOTES
Pt given discharge instructions and reports she verbally understands. All questions answered. SW called due to pt reporting she does not have any money for ride home or insurance. SW providing pt with bus pass. Pt stable and ambulatory upon discharge.

## 2019-11-28 NOTE — ED NOTES
Pt resting quietly in bed with lights dimmed. Respirations even and unlabored. Skin warm and dry.

## 2019-11-28 NOTE — ED PROVIDER NOTES
ED Provider Note    Scribed for Aric Amaya M.D. by Judie Riggins. 11/28/2019  10:45 AM    Primary care provider: Pcp Pt States None  Means of arrival: EMS  History obtained from: Patient  History limited by: None    CHIEF COMPLAINT  Chief Complaint   Patient presents with   • Difficulty breathing secondary to potential anaphylactic shock       HPI  Wally Dwyer is a 54 y.o. female, with a history of asthma and DVT, who presents to the Emergency Department for difficulty breathing secondary to potential anaphylactic shock onset prior to arrival. Patient notes she was bitten by multiple baby black widows and her throat started to close. She reports she self administered 2 Epi Pens with moderate alleviation of symptoms. She additionally endorses congestion and productive cough with white sputum. She notes her friend has pneumonia and is concerned because she has not received the vaccination. She reports taking an aspirin a day and is not currently anticoagulated. No additional pain or symptoms noted at this time.    REVIEW OF SYSTEMS  Pertinent positives include difficulty breathing, throat closing, congestion, and productive cough. Pertinent negatives include: No additional pain or symptoms noted at this time.  All other systems reviewed and negative.    PAST MEDICAL HISTORY   has a past medical history of Arthritis, ASTHMA, Back pain (11/16/2012), Blood clot in vein, Brain injury ([facility]) ([facility]), Bronchitis, DVT (deep venous thrombosis) ([facility]) ([facility]) (8/7/2010), GERD (gastroesophageal reflux disease), Hypertension, Neck pain, PE (pulmonary embolism) (8/7/2010), Psychiatric disorder, PTSD (post-traumatic stress disorder), and Seizure ([facility]) ([facility]) (10/18/2017).    SURGICAL HISTORY   has a past surgical history that includes gyn surgery; tubal coagulation laparoscopic bilateral; and primary c section.    SOCIAL HISTORY  Social History     Tobacco Use   • Smoking status: Current Every Day Smoker      Packs/day: 1.00     Years: 20.00     Pack years: 20.00     Types: Cigarettes   • Smokeless tobacco: Never Used   Substance Use Topics   • Alcohol use: No   • Drug use: No     Types: Inhaled      Social History     Substance and Sexual Activity   Drug Use No   • Types: Inhaled       FAMILY HISTORY  Family History   Problem Relation Age of Onset   • Hyperlipidemia Mother    • Hypertension Mother    • Diabetes Mother    • Heart Disease Mother    • Diabetes Father    • Hypertension Father    • Heart Disease Father    • DVT Father    • Diabetes Sister    • Other Sister         PTSD   • Lung Disease Neg Hx    • Cancer Neg Hx    • Stroke Neg Hx        CURRENT MEDICATIONS  Current Outpatient Medications:   •  baclofen (LIORESAL) 10 MG Tab, Take 1 Tab by mouth 3 times a day., Disp: 90 Tab, Rfl: 3  •  methylPREDNISolone (MEDROL) 4 MG Tab, Take as per the package instructions., Disp: 21 Tab, Rfl: 0  •  Multiple Vitamins-Minerals (MULTIVITAMIN ADULTS PO), Take  by mouth., Disp: , Rfl:   •  Omega-3 Fatty Acids (FISH OIL PO), Take  by mouth., Disp: , Rfl:   •  Probiotic Product (PROBIOTIC PO), Take  by mouth., Disp: , Rfl:   •  RASPBERRY KETONES PO, Take  by mouth., Disp: , Rfl:   •  nicotine (NICODERM) 14 MG/24HR PATCH 24 HR, Apply 1 Patch to skin as directed every 24 hours., Disp: 30 Patch, Rfl: 3  •  aspirin (ASA) 81 MG Chew Tab chewable tablet, Take 1 Tab by mouth every day., Disp: 100 Tab, Rfl: 6  •  gabapentin (NEURONTIN) 600 MG tablet, Take 1.5 Tabs by mouth 3 times a day., Disp: 90 Tab, Rfl: 6  •  lamotrigine (LAMICTAL) 200 MG tablet, Take 1 Tab by mouth every day., Disp: 30 Tab, Rfl: 6  •  albuterol 108 (90 Base) MCG/ACT Aero Soln inhalation aerosol, Inhale 2 Puffs by mouth every 6 hours as needed for Shortness of Breath., Disp: 8.5 g, Rfl: 6      ALLERGIES  Allergies   Allergen Reactions   • Haldol [Haloperidol Lactate] Anaphylaxis   • Abilify Shortness of Breath and Swelling   • Bee    • Cogentin [Benztropine  "Mesylate] Shortness of Breath   • Latuda [Lurasidone Hcl]    • Penicillins    • Tetracycline        PHYSICAL EXAM  VITAL SIGNS: /71   Pulse 80   Temp 36.7 °C (98.1 °F) (Oral)   Resp (!) 24   Ht 1.676 m (5' 6\")   Wt 97.5 kg (215 lb)   SpO2 96% Comment: pt placed on o2 at 2L nc due to pt dipping to 83% o2 on RA.  BMI 34.70 kg/m²     Vital signs reviewed.  Constitutional: Well-appearing.  Conversant  Mouth/Throat: Oropharynx is moist without exudate  Neck: Supple, no stridor.   Cardiovascular: Normal rate and rhythm  Pulmonary/Chest: No wheezes, rhonchi, or rales.   Abdominal: Abdomen soft with no tenderness  Musculoskeletal: Exhibits no edema.   Skin: Weeping red rash at right inguinal region. Puncture wounds in right thigh with surrounding pallor of skin. Tinea cure on inner right thigh.  Neurological: Patient is alert and oriented to person, place, and time.     LABS  Results for orders placed or performed during the hospital encounter of 11/28/19   CBC WITH DIFFERENTIAL   Result Value Ref Range    WBC 10.6 4.8 - 10.8 K/uL    RBC 4.50 4.20 - 5.40 M/uL    Hemoglobin 13.4 12.0 - 16.0 g/dL    Hematocrit 41.5 37.0 - 47.0 %    MCV 92.2 81.4 - 97.8 fL    MCH 29.8 27.0 - 33.0 pg    MCHC 32.3 (L) 33.6 - 35.0 g/dL    RDW 45.6 35.9 - 50.0 fL    Platelet Count 293 164 - 446 K/uL    MPV 9.6 9.0 - 12.9 fL    Neutrophils-Polys 62.90 44.00 - 72.00 %    Lymphocytes 28.00 22.00 - 41.00 %    Monocytes 7.10 0.00 - 13.40 %    Eosinophils 0.90 0.00 - 6.90 %    Basophils 0.80 0.00 - 1.80 %    Immature Granulocytes 0.30 0.00 - 0.90 %    Nucleated RBC 0.00 /100 WBC    Neutrophils (Absolute) 6.66 2.00 - 7.15 K/uL    Lymphs (Absolute) 2.97 1.00 - 4.80 K/uL    Monos (Absolute) 0.75 0.00 - 0.85 K/uL    Eos (Absolute) 0.10 0.00 - 0.51 K/uL    Baso (Absolute) 0.08 0.00 - 0.12 K/uL    Immature Granulocytes (abs) 0.03 0.00 - 0.11 K/uL    NRBC (Absolute) 0.00 K/uL   BASIC METABOLIC PANEL   Result Value Ref Range    Sodium 140 135 - 145 " mmol/L    Potassium 3.5 (L) 3.6 - 5.5 mmol/L    Chloride 105 96 - 112 mmol/L    Co2 27 20 - 33 mmol/L    Glucose 117 (H) 65 - 99 mg/dL    Bun 13 8 - 22 mg/dL    Creatinine 0.58 0.50 - 1.40 mg/dL    Calcium 9.1 8.5 - 10.5 mg/dL    Anion Gap 8.0 0.0 - 11.9   Influenza A/B By PCR (Adult - Flu Only)   Result Value Ref Range    Influenza virus A RNA Negative Negative    Influenza virus B, PCR Negative Negative   ESTIMATED GFR   Result Value Ref Range    GFR If African American >60 >60 mL/min/1.73 m 2    GFR If Non African American >60 >60 mL/min/1.73 m 2     All labs reviewed by me.    RADIOLOGY  DX-CHEST-2 VIEWS   Final Result      Mild perihilar opacification with bronchial thickening suggesting bronchitis without evidence of pneumonia.        The radiologist's interpretation of all radiological studies have been reviewed by me.    COURSE & MEDICAL DECISION MAKING  Pertinent Labs & Imaging studies reviewed. (See chart for details) The patient's [facility] and past medical records were reviewed    10:45 AM - Patient seen and examined at bedside. I informed the patient the need for labs and radiology to rule out any emergent processes. Currently awaiting results before deciding if intervention is necessary. Patient verbalizes understanding and agreement to this plan of care. Ordered DX-chest, influenza A/B, CBC with diff, BMP, and estimated GFR to evaluate her symptoms. The differential diagnoses include but are not limited to: Pneumonia, influenza, viral URI    12:59 PM - Patient was reevaluated at bedside. Discussed lab and radiology results with the patient and informed them that they were reassuring and revealed no signs of infection or influenza. Discussed discharging patient home with strict ED precautions. Patient verbalizes understanding and agreement to this plan of care.     The patient will return for new or worsening symptoms and is stable at the time of discharge.    The patient is referred to a primary  physician for blood pressure management, diabetic screening, and for all other preventative health concerns.    DISPOSITION:  Patient will be discharged home in stable condition.    FOLLOW UP:  No follow-up provider specified.    OUTPATIENT MEDICATIONS:  Discharge Medication List as of 11/28/2019  1:13 PM        FINAL IMPRESSION  1. Upper respiratory tract infection, unspecified type         I, Judie Riggins (Amparo), am scribing for, and in the presence of, Aric Amaya M.D..    Electronically signed by: Judie Riggins (Amparo), 11/28/2019    I, Aric Amaya M.D. personally performed the services described in this documentation, as scribed by Judie Riggins in my presence, and it is both accurate and complete. C.    The note accurately reflects work and decisions made by me.  Aric Amaya  11/28/2019  4:58 PM

## 2019-11-28 NOTE — ED NOTES
Pt placed in gown. Connected to bp, o2, and cardiac monitor. No complaints of pain at this time. NSR on monitor. Will continue to monitor.

## 2019-11-29 VITALS
SYSTOLIC BLOOD PRESSURE: 157 MMHG | TEMPERATURE: 97.5 F | RESPIRATION RATE: 11 BRPM | DIASTOLIC BLOOD PRESSURE: 99 MMHG | HEART RATE: 90 BPM | BODY MASS INDEX: 33.34 KG/M2 | WEIGHT: 220 LBS | HEIGHT: 68 IN | OXYGEN SATURATION: 97 %

## 2019-11-29 LAB
AMPHET UR QL SCN: NEGATIVE
BARBITURATES UR QL SCN: NEGATIVE
BENZODIAZ UR QL SCN: NEGATIVE
BZE UR QL SCN: NEGATIVE
CANNABINOIDS UR QL SCN: POSITIVE
METHADONE UR QL SCN: NEGATIVE
OPIATES UR QL SCN: NEGATIVE
OXYCODONE UR QL SCN: NEGATIVE
PCP UR QL SCN: NEGATIVE
PROPOXYPH UR QL SCN: NEGATIVE

## 2019-11-29 PROCEDURE — A9270 NON-COVERED ITEM OR SERVICE: HCPCS | Performed by: EMERGENCY MEDICINE

## 2019-11-29 PROCEDURE — 700102 HCHG RX REV CODE 250 W/ 637 OVERRIDE(OP): Performed by: EMERGENCY MEDICINE

## 2019-11-29 PROCEDURE — 94760 N-INVAS EAR/PLS OXIMETRY 1: CPT

## 2019-11-29 PROCEDURE — 80307 DRUG TEST PRSMV CHEM ANLYZR: CPT

## 2019-11-29 PROCEDURE — 99284 EMERGENCY DEPT VISIT MOD MDM: CPT | Mod: GC | Performed by: PSYCHIATRY & NEUROLOGY

## 2019-11-29 RX ORDER — RISPERIDONE 2 MG/1
2 TABLET ORAL
Status: DISCONTINUED | OUTPATIENT
Start: 2019-11-29 | End: 2019-11-29 | Stop reason: HOSPADM

## 2019-11-29 RX ORDER — RISPERIDONE 2 MG/1
2 TABLET ORAL
Qty: 30 TAB | Refills: 0 | Status: SHIPPED | OUTPATIENT
Start: 2019-11-29 | End: 2019-12-29

## 2019-11-29 RX ORDER — ALBUTEROL SULFATE 90 UG/1
2 AEROSOL, METERED RESPIRATORY (INHALATION) EVERY 4 HOURS PRN
Status: DISCONTINUED | OUTPATIENT
Start: 2019-11-29 | End: 2019-11-29 | Stop reason: HOSPADM

## 2019-11-29 RX ADMIN — RISPERIDONE 2 MG: 2 TABLET ORAL at 06:08

## 2019-11-29 RX ADMIN — ALBUTEROL SULFATE 2 PUFF: 90 AEROSOL, METERED RESPIRATORY (INHALATION) at 12:17

## 2019-11-29 ASSESSMENT — ENCOUNTER SYMPTOMS
PALPITATIONS: 0
BLURRED VISION: 0
DIZZINESS: 1
DEPRESSION: 1
FEVER: 1
SHORTNESS OF BREATH: 1
DOUBLE VISION: 0
HEADACHES: 1
DIARRHEA: 0
COUGH: 0
VOMITING: 1
SORE THROAT: 1
NAUSEA: 1
BACK PAIN: 0
CHILLS: 1

## 2019-11-29 NOTE — ED NOTES
Pt. Sitting on bed, no distress noted, pt. Updated that Respiratory was called to administer ordered inhaler. Pt. Denies needs, will continue to monitor.

## 2019-11-29 NOTE — ED TRIAGE NOTES
"Chief Complaint   Patient presents with   • Delusional   • Psych Eval     /78   Pulse 90   Temp 36 °C (96.8 °F) (Temporal)   Resp (!) 22   Ht 1.727 m (5' 8\")   Wt 99.8 kg (220 lb)   SpO2 95%   BMI 33.45 kg/m²     Pt brought down by LnD nurse after the pt was dropped off by family member stating, \" I am having a baby\". Rn listened for fetal heart tones and palpated and not hear or feel anything. Rn informed the pt had her tubes tied 17 years ago. Upon arrival, pt is incontinent of urine and screaming delusional words. Pt states \" I have PTSD and Cristin has been torturing me. I have baby spiders crawling all over me!\" Pt becomes aggressive and starts to scream at staff during assessment. Pt placed on legal 2000 for inability to care for herself at this time. Erp at bedside.    Chart up for eval.    "

## 2019-11-29 NOTE — ED NOTES
Pt. Discharged at this time, pt. Denies needs or concerns, pt. Given AVS paperwork and discharge teaching done with patient. Pt. Provided with dry underpants, tshirt and socks. Pt. Ambulated out of ED independently with a steady gait.

## 2019-11-29 NOTE — DISCHARGE PLANNING
Medical Social Work    Referral: Legal Hold    Intervention: Legal Hold Paperwork given to SW by [employer] RN Lidia    Legal Hold Initiated: Date: 11/28/19 Time: 2033    Patient’s Insurance Listed on Face Sheet: none    Referrals sent to: [facility]    This referral contains the following information:  1) Face sheet _x___  2) Page 1 and Page 2 of Legal Hold ___x_  3) Alert Team Assessment/Psych Assessment ___x_  4) Head to toe physical exam __x__  5) Urine Drug Screen _x___  6) Blood Alcohol __x__  7) Vital signs __x__  8) Pregnancy test when applicable __n/a_  9) Medications list _x___    Plan: Patient will transfer to mental health facility once acceptance is obtained  .

## 2019-11-29 NOTE — ED NOTES
"Pt calm and cooperative at this time. Provided some juice per request and informs she is \"feeling much better\". Pt is informed that urine is still needed and to call for help when she needs to use the restroom. The pt informs she cannot urinate at this time.   "
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
## 2019-11-29 NOTE — ED NOTES
Pt. Given breakfast tray at this time, pt. Denies further needs, pt. within visual range of RN from nurses station. Will continue to monitor.

## 2019-11-29 NOTE — ED NOTES
Patient resting quietly in bed with eyes closed without distress, no apparent needs at this time.  Good chest rise and fall.  Sitter outside of room watching patient.

## 2019-11-29 NOTE — ED NOTES
Patient resting quietly in bed with eyes closed without distress, no apparent needs at this time.  Good chest rise and fall. Pt refusing vitals at this time.
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
## 2019-11-29 NOTE — ED NOTES
Pt. Ambulated to and from bathroom independently, pt. Back in bed, laying down, pt. Request an albuterol treatment. Will continue to monitor.

## 2019-11-29 NOTE — ED NOTES
Patient observed resting in gurney, presumably sleeping, no s/s of discomfort or distress at this time. Unlabored breathing & visible chest rise noted. Patient repositions self occasionally. Bed in lowest position, room & floor clear.  Pt in line of sight from RN station.

## 2019-11-29 NOTE — ED NOTES
Pt ambulatory to bathroom w/ steady gait and female chaperone RN in restroom with the pt. Pt requests juice and food for hunger. Pt is calm and cooperative at this time.

## 2019-11-29 NOTE — PROGRESS NOTES
"1930 patient presents to labor and delivery stating she is in labor, no heart tones heard, abdomen palpated and no fundus noted or fetal parts. Dr Burns updated and orders to transfer to ER received. Patient states she had her \"tubes tied\" 17 years ago. She also states she is having a \"mental breakdown\".  1941 patient off floor with ER tech, ER expecting patient  "

## 2019-11-29 NOTE — ED PROVIDER NOTES
ED Provider Note    She care was transferred to me from the nighttime ERP.  No events under my care other than patient requesting an albuterol MDI which she uses at home.  This is been ordered.  She has normal vital signs.  She has no requests and no complaints.  Patient care will be transferred to oncoming ERP.    12:22PM patient been evaluated by psychiatry and they have cleared her from her legal hold.  Patient will be given resources and discharged.

## 2019-11-29 NOTE — ED PROVIDER NOTES
"ED Provider Note    Scribed for Dr. Levi Estrada M.D. by Nicho Kirk. 11/28/2019  7:57 PM    Primary care provider: None noted  Means of arrival: hospital personnel  History obtained from: Patient  History limited by: None    CHIEF COMPLAINT  No chief complaint on file.      [facility]  Wally Dwyer is a 54 y.o. female who presents to the Emergency Department for evaluation of altered mental status onset prior to arrival. Per nursing staff, the patient believes she is pregnant. She was seen by labor a delivery, where she was found not be pregnant. She has history of tubal ligation 17 years ago. She reports she is not taking her medications, including gabapentin 800 mg. She states she was at an AA meeting today, where she was told to \"get out of town\". She says she is having a panic attack. She denies drinking any alcohol or drug use today. She was seen in the ED earlier today, which she presented after \"she was bit by several baby black  spiders\", and was treated with epinephrine. She notes additional symptoms of moderate generalized pain, rash and productive cough, but denies fever, nausea or vomiting. She notes history of pulmonary embolism.     REVIEW OF SYSTEMS  Pertinent positives include altered mental status, panic attack, generalized pain, rash, productive cough. Pertinent negatives include no fever, nausea or vomiting. As above, all other systems reviewed and are negative.   See [facility] for further details.     PAST MEDICAL HISTORY   has a past medical history of Arthritis, ASTHMA, Back pain (11/16/2012), Blood clot in vein, Brain injury ([facility]), Bronchitis, DVT (deep venous thrombosis) ([facility]) (8/7/2010), GERD (gastroesophageal reflux disease), Hypertension, Neck pain, PE (pulmonary embolism) (8/7/2010), Psychiatric disorder, PTSD (post-traumatic stress disorder), and Seizure ([facility]) (10/18/2017).    SURGICAL HISTORY   has a past surgical history that includes gyn surgery; tubal coagulation " laparoscopic bilateral; and primary c section.    SOCIAL HISTORY  Social History     Tobacco Use   • Smoking status: Current Every Day Smoker     Packs/day: 1.00     Years: 20.00     Pack years: 20.00     Types: Cigarettes   • Smokeless tobacco: Never Used   Substance Use Topics   • Alcohol use: No   • Drug use: No     Types: Inhaled      Social History     Substance and Sexual Activity   Drug Use No   • Types: Inhaled       FAMILY HISTORY  Family History   Problem Relation Age of Onset   • Hyperlipidemia Mother    • Hypertension Mother    • Diabetes Mother    • Heart Disease Mother    • Diabetes Father    • Hypertension Father    • Heart Disease Father    • DVT Father    • Diabetes Sister    • Other Sister         PTSD   • Lung Disease Neg Hx    • Cancer Neg Hx    • Stroke Neg Hx        CURRENT MEDICATIONS  Current Outpatient Medications:   •  baclofen (LIORESAL) 10 MG Tab, Take 1 Tab by mouth 3 times a day., Disp: 90 Tab, Rfl: 3  •  methylPREDNISolone (MEDROL) 4 MG Tab, Take as per the package instructions., Disp: 21 Tab, Rfl: 0  •  Multiple Vitamins-Minerals (MULTIVITAMIN ADULTS PO), Take  by mouth., Disp: , Rfl:   •  Omega-3 Fatty Acids (FISH OIL PO), Take  by mouth., Disp: , Rfl:   •  Probiotic Product (PROBIOTIC PO), Take  by mouth., Disp: , Rfl:   •  RASPBERRY KETONES PO, Take  by mouth., Disp: , Rfl:   •  nicotine (NICODERM) 14 MG/24HR PATCH 24 HR, Apply 1 Patch to skin as directed every 24 hours., Disp: 30 Patch, Rfl: 3  •  aspirin (ASA) 81 MG Chew Tab chewable tablet, Take 1 Tab by mouth every day., Disp: 100 Tab, Rfl: 6  •  gabapentin (NEURONTIN) 600 MG tablet, Take 1.5 Tabs by mouth 3 times a day., Disp: 90 Tab, Rfl: 6  •  lamotrigine (LAMICTAL) 200 MG tablet, Take 1 Tab by mouth every day., Disp: 30 Tab, Rfl: 6  •  albuterol 108 (90 Base) MCG/ACT Aero Soln inhalation aerosol, Inhale 2 Puffs by mouth every 6 hours as needed for Shortness of Breath., Disp: 8.5 g, Rfl: 6     ALLERGIES  Allergies   Allergen  Reactions   • Haldol [Haloperidol Lactate] Anaphylaxis   • Abilify Shortness of Breath and Swelling   • Bee    • Cogentin [Benztropine Mesylate] Shortness of Breath   • Latuda [Lurasidone Hcl]    • Penicillins    • Tetracycline        PHYSICAL EXAM  VITAL SIGNS: /78   Pulse 93   Temp 36 °C (96.8 °F) (Temporal)   Resp 20   SpO2 95%     Constitutional: Well developed, Well nourished, moderate distress,    HENT: Normocephalic, Atraumatic, Bilateral external ears normal   Thorax & Lungs: Non labored breathing   Skin: A few marks on abdomen and right thigh that looked like bug bites, scabbed lesion on knee that appears old. Warm, Dry  Extremities:, No edema   Musculoskeletal: No major deformities noted.   Neurologic: Awake, alert. Moves all extremities spontaneously.  Psychiatric: Agitated, tearful, delusional.  Appears frankly psychotic l.      LABS  Results for orders placed or performed during the hospital encounter of 11/28/19   POC BREATHALIZER   Result Value Ref Range    POC Breathalizer 0.022 (A) 0.00 - 0.01 Percent      All labs reviewed by me.    COURSE & MEDICAL DECISION MAKING  Pertinent Labs & Imaging studies reviewed. (See chart for details)    7:57 PM - Patient seen and examined at bedside. Patient will be treated with gabapentin 800 mg capsule, Motrin 600 mg tablet. Ordered POC Breathalyzer, Urine Drug Screen to evaluate her symptoms.    8:15 PM -  I discussed the patient's case and the above findings with [facility], who agree to evaluate the patient.     8:16 PM - The patient will be treated with Ativan 1 mg tablet.     Decision Making:  This patient presenting who appears completely psychotic and delusional interviewed by life skills felt to need a legal hold which I will complete and psych he had a trach facility transfer anticipated I will past care on to another ER physician    FINAL IMPRESSION  Acute psychosis     I, Nicho Kirk (Amparo), am scribing for, and in the presence of,  Levi Estrada M.D..    Electronically signed by: Nicho Kirk (Scribe), 11/28/2019    ILevi M.D. personally performed the services described in this documentation, as scribed by Nicho Kirk in my presence, and it is both accurate and complete. C.     The note accurately reflects work and decisions made by me.  Levi Estrada  11/28/2019  9:27 PM

## 2019-11-29 NOTE — DISCHARGE PLANNING
ALERT team  note:  54 year old female admitted 11/29/19, legal hold, inability to care for self; Medi-stef insurance plan; pt evaluated by [facility] ED psychiatry team, legal hold DC'd; no SI, HI, or self-harm ideation noted; writer RN reviewed community MH resources with pt, with written information given, including [facility], [facility]; pt verbalized understanding; pt given written RX for Risperdal; pt to DC to self, with bus pass given to pt

## 2019-11-29 NOTE — ED NOTES
Pt ambulatory to bathroom with standby assist for urine sample after urinating in her bed. The pt spills her urine in the hat that was placed in the toilet. Unable to obtain urine at this time.

## 2019-11-29 NOTE — ED NOTES
Patient resting quietly in bed with eyes closed without distress, no apparent needs at this time.  Good chest rise and fall. Pt continuing to refuse to urinate.

## 2019-12-02 ENCOUNTER — HOSPITAL ENCOUNTER (EMERGENCY)
Facility: MEDICAL CENTER | Age: 54
End: 2019-12-03
Attending: EMERGENCY MEDICINE
Payer: MEDICAID

## 2019-12-02 ENCOUNTER — APPOINTMENT (OUTPATIENT)
Dept: RADIOLOGY | Facility: MEDICAL CENTER | Age: 54
End: 2019-12-02
Attending: EMERGENCY MEDICINE
Payer: MEDICAID

## 2019-12-02 DIAGNOSIS — F23 ACUTE PSYCHOSIS (HCC): ICD-10-CM

## 2019-12-02 DIAGNOSIS — Z91.148 NON COMPLIANCE W MEDICATION REGIMEN: ICD-10-CM

## 2019-12-02 LAB
ALBUMIN SERPL BCP-MCNC: 3.9 G/DL (ref 3.2–4.9)
ALBUMIN/GLOB SERPL: 1.7 G/DL
ALP SERPL-CCNC: 62 U/L (ref 30–99)
ALT SERPL-CCNC: 22 U/L (ref 2–50)
AMPHET UR QL SCN: NEGATIVE
ANION GAP SERPL CALC-SCNC: 9 MMOL/L (ref 0–11.9)
AST SERPL-CCNC: 23 U/L (ref 12–45)
BARBITURATES UR QL SCN: NEGATIVE
BASOPHILS # BLD AUTO: 0.5 % (ref 0–1.8)
BASOPHILS # BLD: 0.05 K/UL (ref 0–0.12)
BENZODIAZ UR QL SCN: NEGATIVE
BILIRUB SERPL-MCNC: 0.3 MG/DL (ref 0.1–1.5)
BUN SERPL-MCNC: 15 MG/DL (ref 8–22)
BZE UR QL SCN: NEGATIVE
CALCIUM SERPL-MCNC: 9.2 MG/DL (ref 8.5–10.5)
CANNABINOIDS UR QL SCN: POSITIVE
CHLORIDE SERPL-SCNC: 106 MMOL/L (ref 96–112)
CO2 SERPL-SCNC: 27 MMOL/L (ref 20–33)
CREAT SERPL-MCNC: 0.55 MG/DL (ref 0.5–1.4)
EOSINOPHIL # BLD AUTO: 0.22 K/UL (ref 0–0.51)
EOSINOPHIL NFR BLD: 2.2 % (ref 0–6.9)
ERYTHROCYTE [DISTWIDTH] IN BLOOD BY AUTOMATED COUNT: 45.6 FL (ref 35.9–50)
FLUAV RNA SPEC QL NAA+PROBE: NEGATIVE
FLUBV RNA SPEC QL NAA+PROBE: NEGATIVE
GLOBULIN SER CALC-MCNC: 2.3 G/DL (ref 1.9–3.5)
GLUCOSE SERPL-MCNC: 127 MG/DL (ref 65–99)
HCT VFR BLD AUTO: 40.1 % (ref 37–47)
HGB BLD-MCNC: 13.3 G/DL (ref 12–16)
IMM GRANULOCYTES # BLD AUTO: 0.06 K/UL (ref 0–0.11)
IMM GRANULOCYTES NFR BLD AUTO: 0.6 % (ref 0–0.9)
LIPASE SERPL-CCNC: 26 U/L (ref 11–82)
LYMPHOCYTES # BLD AUTO: 2.38 K/UL (ref 1–4.8)
LYMPHOCYTES NFR BLD: 23.3 % (ref 22–41)
MCH RBC QN AUTO: 30.9 PG (ref 27–33)
MCHC RBC AUTO-ENTMCNC: 33.2 G/DL (ref 33.6–35)
MCV RBC AUTO: 93.3 FL (ref 81.4–97.8)
METHADONE UR QL SCN: NEGATIVE
MONOCYTES # BLD AUTO: 0.74 K/UL (ref 0–0.85)
MONOCYTES NFR BLD AUTO: 7.2 % (ref 0–13.4)
NEUTROPHILS # BLD AUTO: 6.76 K/UL (ref 2–7.15)
NEUTROPHILS NFR BLD: 66.2 % (ref 44–72)
NRBC # BLD AUTO: 0 K/UL
NRBC BLD-RTO: 0 /100 WBC
OPIATES UR QL SCN: NEGATIVE
OXYCODONE UR QL SCN: NEGATIVE
PCP UR QL SCN: NEGATIVE
PLATELET # BLD AUTO: 295 K/UL (ref 164–446)
PMV BLD AUTO: 9.4 FL (ref 9–12.9)
POC BREATHALIZER: 0.01 PERCENT (ref 0–0.01)
POTASSIUM SERPL-SCNC: 3.6 MMOL/L (ref 3.6–5.5)
PROPOXYPH UR QL SCN: NEGATIVE
PROT SERPL-MCNC: 6.2 G/DL (ref 6–8.2)
RBC # BLD AUTO: 4.3 M/UL (ref 4.2–5.4)
SODIUM SERPL-SCNC: 142 MMOL/L (ref 135–145)
TROPONIN T SERPL-MCNC: 12 NG/L (ref 6–19)
WBC # BLD AUTO: 10.2 K/UL (ref 4.8–10.8)

## 2019-12-02 PROCEDURE — 80307 DRUG TEST PRSMV CHEM ANLYZR: CPT

## 2019-12-02 PROCEDURE — 85025 COMPLETE CBC W/AUTO DIFF WBC: CPT

## 2019-12-02 PROCEDURE — 99285 EMERGENCY DEPT VISIT HI MDM: CPT

## 2019-12-02 PROCEDURE — 84484 ASSAY OF TROPONIN QUANT: CPT

## 2019-12-02 PROCEDURE — 71045 X-RAY EXAM CHEST 1 VIEW: CPT

## 2019-12-02 PROCEDURE — 302970 POC BREATHALIZER

## 2019-12-02 PROCEDURE — 700102 HCHG RX REV CODE 250 W/ 637 OVERRIDE(OP): Performed by: EMERGENCY MEDICINE

## 2019-12-02 PROCEDURE — 83690 ASSAY OF LIPASE: CPT

## 2019-12-02 PROCEDURE — 87502 INFLUENZA DNA AMP PROBE: CPT

## 2019-12-02 PROCEDURE — 80053 COMPREHEN METABOLIC PANEL: CPT

## 2019-12-02 PROCEDURE — A9270 NON-COVERED ITEM OR SERVICE: HCPCS | Performed by: EMERGENCY MEDICINE

## 2019-12-02 PROCEDURE — 36415 COLL VENOUS BLD VENIPUNCTURE: CPT

## 2019-12-02 RX ORDER — LORAZEPAM 1 MG/1
1 TABLET ORAL ONCE
Status: COMPLETED | OUTPATIENT
Start: 2019-12-02 | End: 2019-12-02

## 2019-12-02 RX ADMIN — LORAZEPAM 1 MG: 1 TABLET ORAL at 21:20

## 2019-12-03 VITALS
OXYGEN SATURATION: 96 % | SYSTOLIC BLOOD PRESSURE: 128 MMHG | DIASTOLIC BLOOD PRESSURE: 76 MMHG | RESPIRATION RATE: 16 BRPM | HEART RATE: 84 BPM | HEIGHT: 70 IN | WEIGHT: 210 LBS | TEMPERATURE: 97.9 F | BODY MASS INDEX: 30.06 KG/M2

## 2019-12-03 PROCEDURE — 700102 HCHG RX REV CODE 250 W/ 637 OVERRIDE(OP): Performed by: EMERGENCY MEDICINE

## 2019-12-03 PROCEDURE — A9270 NON-COVERED ITEM OR SERVICE: HCPCS | Performed by: EMERGENCY MEDICINE

## 2019-12-03 PROCEDURE — 99284 EMERGENCY DEPT VISIT MOD MDM: CPT | Performed by: PSYCHIATRY & NEUROLOGY

## 2019-12-03 PROCEDURE — 90791 PSYCH DIAGNOSTIC EVALUATION: CPT

## 2019-12-03 RX ORDER — CHLORAL HYDRATE 500 MG
1000 CAPSULE ORAL DAILY
COMMUNITY

## 2019-12-03 RX ORDER — IBUPROFEN 200 MG
800 TABLET ORAL EVERY 6 HOURS PRN
COMMUNITY
End: 2020-08-04

## 2019-12-03 RX ORDER — ACETAMINOPHEN 325 MG/1
650 TABLET ORAL ONCE
Status: COMPLETED | OUTPATIENT
Start: 2019-12-03 | End: 2019-12-03

## 2019-12-03 RX ORDER — LORAZEPAM 2 MG/1
2 TABLET ORAL ONCE
Status: COMPLETED | OUTPATIENT
Start: 2019-12-03 | End: 2019-12-03

## 2019-12-03 RX ADMIN — ACETAMINOPHEN 650 MG: 325 TABLET, FILM COATED ORAL at 03:55

## 2019-12-03 RX ADMIN — LORAZEPAM 2 MG: 2 TABLET ORAL at 03:55

## 2019-12-03 ASSESSMENT — ENCOUNTER SYMPTOMS
DIARRHEA: 0
VOMITING: 0
HEADACHES: 0
SPUTUM PRODUCTION: 1
SHORTNESS OF BREATH: 1
TINGLING: 1
DOUBLE VISION: 0
MYALGIAS: 1
PALPITATIONS: 0
CONSTIPATION: 0
NAUSEA: 1
COUGH: 1
BLURRED VISION: 0
FEVER: 1
DEPRESSION: 1
ABDOMINAL PAIN: 1
SORE THROAT: 1
CHILLS: 0
BACK PAIN: 1
HALLUCINATIONS: 1

## 2019-12-03 NOTE — ED NOTES
Med Rec completed per patient   Allergies reviewed  No ORAL antibiotics in last 14 days    Patient received some discharge prescriptions but hasn't picked them up yet

## 2019-12-03 NOTE — ED PROVIDER NOTES
"ED Provider Note    Scribed for Demetrius Estrada M.D. by Hank Szymanski. 12/2/2019, 8:51 PM.    Primary care provider: No PCP reported  Means of arrival: Ambulance  History obtained from: Patient  History limited by: Poor Historian     CHIEF COMPLAINT  Chief Complaint   Patient presents with   • Paranoid     BIB EMS for paranoid delusions. Patient believes she was bitten by spiders and exposed to asbestos at home. Patient took 150 benedryl at home. Hx of schizophrenia, bipolar, depression. Denies SI/HI.    • Anxiety     complains of anxiety and \"heart has been hurting for a while\"        HPI  Wally Dwyer is a 54 y.o. female who presents to the Emergency Department for evaluation of anxiety and paranoia onset prior to ED arrival. She states she does not take any medication for her anxiety. Negative for fever. No alleviating or exacerbating factors identified. The patient has a history of kidney disease, PTSD, panic disorder, blood clots, borderline diabetic, neuropathy, and pulmonary embolism. She denies taking any medication for any of her medical problems.     Further history of present illness cannot be obtained due to the patient's poor history.     REVIEW OF SYSTEMS  See HPI for further details. All other systems are negative.      Further review of systems cannot be obtained due to the patient's poor history.     PAST MEDICAL HISTORY   has a past medical history of Arthritis, ASTHMA, Back pain (11/16/2012), Blood clot in vein, Brain injury ([facility]), Bronchitis, DVT (deep venous thrombosis) ([facility]) (8/7/2010), GERD (gastroesophageal reflux disease), Hypertension, Neck pain, PE (pulmonary embolism) (8/7/2010), Psychiatric disorder, PTSD (post-traumatic stress disorder), and Seizure ([facility]) (10/18/2017).    SURGICAL HISTORY   has a past surgical history that includes gyn surgery; tubal coagulation laparoscopic bilateral; and primary c section.    SOCIAL HISTORY  Social History     Tobacco Use   • Smoking " "status: Current Every Day Smoker     Packs/day: 1.00     Years: 20.00     Pack years: 20.00     Types: Cigarettes   • Smokeless tobacco: Never Used   Substance Use Topics   • Alcohol use: No   • Drug use: No     Types: Inhaled      Social History     Substance and Sexual Activity   Drug Use No   • Types: Inhaled       FAMILY HISTORY  Family History   Problem Relation Age of Onset   • Hyperlipidemia Mother    • Hypertension Mother    • Diabetes Mother    • Heart Disease Mother    • Diabetes Father    • Hypertension Father    • Heart Disease Father    • DVT Father    • Diabetes Sister    • Other Sister         PTSD   • Lung Disease Neg Hx    • Cancer Neg Hx    • Stroke Neg Hx        CURRENT MEDICATIONS  Reviewed.  See Encounter Summary.     ALLERGIES  Allergies   Allergen Reactions   • Haldol [Haloperidol Lactate] Anaphylaxis   • Abilify Shortness of Breath and Swelling   • Bee    • Cogentin [Benztropine Mesylate] Shortness of Breath   • Latuda [Lurasidone Hcl]    • Penicillins    • Tetracycline        PHYSICAL EXAM  VITAL SIGNS: /67   Pulse 89   Temp 36.6 °C (97.9 °F) (Temporal)   Resp 18   Ht 1.778 m (5' 10\")   Wt 95.3 kg (210 lb)   SpO2 96%   BMI 30.13 kg/m²   Constitutional: Alert in no apparent distress. Mildly unkempt.   HENT: No signs of trauma, Bilateral external ears normal, Nose normal.   Eyes: Pupils are equal and reactive, Conjunctiva normal, Non-icteric.   Neck: Normal range of motion, No tenderness, Supple, No stridor.   Cardiovascular: Regular rate and rhythm, no murmurs.   Thorax & Lungs: Diffuse wheezes. No respiratory distress, No chest tenderness.   Abdomen: Bowel sounds normal, Soft, No tenderness, No masses, No pulsatile masses. No peritoneal signs.  Skin: Warm, Dry, No erythema, No rash.   Back: No bony tenderness, No CVA tenderness.   Extremities: Intact distal pulses, No edema, No tenderness, No cyanosis  Musculoskeletal: Good range of motion in all major joints. No tenderness to " palpation or major deformities noted.   Neurologic: Alert , Normal motor function, Normal sensory function, No focal deficits noted.   Psychiatric: Affect normal, Judgment normal, Mood normal.     DIAGNOSTIC STUDIES / PROCEDURES     LABS  Results for orders placed or performed during the hospital encounter of 12/02/19   CBC w/ Differential   Result Value Ref Range    WBC 10.2 4.8 - 10.8 K/uL    RBC 4.30 4.20 - 5.40 M/uL    Hemoglobin 13.3 12.0 - 16.0 g/dL    Hematocrit 40.1 37.0 - 47.0 %    MCV 93.3 81.4 - 97.8 fL    MCH 30.9 27.0 - 33.0 pg    MCHC 33.2 (L) 33.6 - 35.0 g/dL    RDW 45.6 35.9 - 50.0 fL    Platelet Count 295 164 - 446 K/uL    MPV 9.4 9.0 - 12.9 fL    Neutrophils-Polys 66.20 44.00 - 72.00 %    Lymphocytes 23.30 22.00 - 41.00 %    Monocytes 7.20 0.00 - 13.40 %    Eosinophils 2.20 0.00 - 6.90 %    Basophils 0.50 0.00 - 1.80 %    Immature Granulocytes 0.60 0.00 - 0.90 %    Nucleated RBC 0.00 /100 WBC    Neutrophils (Absolute) 6.76 2.00 - 7.15 K/uL    Lymphs (Absolute) 2.38 1.00 - 4.80 K/uL    Monos (Absolute) 0.74 0.00 - 0.85 K/uL    Eos (Absolute) 0.22 0.00 - 0.51 K/uL    Baso (Absolute) 0.05 0.00 - 0.12 K/uL    Immature Granulocytes (abs) 0.06 0.00 - 0.11 K/uL    NRBC (Absolute) 0.00 K/uL   Complete Metabolic Panel (CMP)   Result Value Ref Range    Sodium 142 135 - 145 mmol/L    Potassium 3.6 3.6 - 5.5 mmol/L    Chloride 106 96 - 112 mmol/L    Co2 27 20 - 33 mmol/L    Anion Gap 9.0 0.0 - 11.9    Glucose 127 (H) 65 - 99 mg/dL    Bun 15 8 - 22 mg/dL    Creatinine 0.55 0.50 - 1.40 mg/dL    Calcium 9.2 8.5 - 10.5 mg/dL    AST(SGOT) 23 12 - 45 U/L    ALT(SGPT) 22 2 - 50 U/L    Alkaline Phosphatase 62 30 - 99 U/L    Total Bilirubin 0.3 0.1 - 1.5 mg/dL    Albumin 3.9 3.2 - 4.9 g/dL    Total Protein 6.2 6.0 - 8.2 g/dL    Globulin 2.3 1.9 - 3.5 g/dL    A-G Ratio 1.7 g/dL   Troponin STAT   Result Value Ref Range    Troponin T 12 6 - 19 ng/L   Lipase   Result Value Ref Range    Lipase 26 11 - 82 U/L   Urine Drug  Screen   Result Value Ref Range    Amphetamines Urine Negative Negative    Barbiturates Negative Negative    Benzodiazepines Negative Negative    Cocaine Metabolite Negative Negative    Methadone Negative Negative    Opiates Negative Negative    Oxycodone Negative Negative    Phencyclidine -Pcp Negative Negative    Propoxyphene Negative Negative    Cannabinoid Metab Positive (A) Negative   Influenza By PCR, A/B   Result Value Ref Range    Influenza virus A RNA Negative Negative    Influenza virus B, PCR Negative Negative   ESTIMATED GFR   Result Value Ref Range    GFR If African American >60 >60 mL/min/1.73 m 2    GFR If Non African American >60 >60 mL/min/1.73 m 2   POC BREATHALIZER   Result Value Ref Range    POC Breathalizer 0.005 0.00 - 0.01 Percent       All labs were reviewed by me.    RADIOLOGY  DX-CHEST-PORTABLE (1 VIEW)   Final Result      No acute cardiopulmonary abnormality identified.        The radiologist's interpretation of all radiological studies and images have been reviewed by me.    COURSE & MEDICAL DECISION MAKING  Pertinent Labs & Imaging studies reviewed. (See chart for details)    8:51 PM - Patient seen and examined at bedside. Patient will be treated with Ativan 1 mg. Ordered CBC with differential, CMP, Troponin STAT, lipase, urine drug screen, influenza A&B by PCR, and POC breathalizer to evaluate her symptoms. I will await lab and radiology results before determining whether interventions are necessary.    9:36 PM - Ordered estimated GFR to further evaluate.     10:30 PM - Consulted Aaron (Behavioral Health) who agrees to evaluate the patient.      11:45 PM - The patient will be placed on a legal hold following evaluation by Behavioral Health.      Decision Making:  This is a 54 y.o. year old female who presents with above complaints.  Patient with a past medical history that required further medical screening for complete medical clearance.  At this point this is complete.  Does not  appear to be any underlying organic causation or other more worrisome medical needs at this point.  I do believe the patient is likely experiencing acute psychosis secondary to medication noncompliance.  She is per my opinion unable to care for self.  I have asked that the behavioral health team also evaluate the patient and they agree to this.  This point patient has been started on legal hold which I have upheld and she will need ongoing inpatient psychiatric care at this time.      FINAL IMPRESSION  1. Acute psychosis (HCC)    2. Non compliance w medication regimen          Hank FLETCHER (Amparo), am scribing for, and in the presence of, Demetrius Estrada M.D..    Electronically signed by: Hank Szymanski (Amparo), 12/2/2019    Demetrius FLETCHER M.D. personally performed the services described in this documentation, as scribed by Hank Szymanski in my presence, and it is both accurate and complete. C.     The note accurately reflects work and decisions made by me.  Demetrius Estrada  12/3/2019  3:03 AM

## 2019-12-03 NOTE — DISCHARGE PLANNING
Awake and alert. Denies S/I or H/I, does acknowledge that he is very depressed and anxious, and is still having tactile and auditory hallucinations.  Continue to monitor for safety until bed is available in a psychiatric facility.

## 2019-12-03 NOTE — ED NOTES
Pt upset about having to be here in the ED still. Voices frustration regarding not having her teeth and glasses. [facility] doctor states that she may be able to be discharged today.

## 2019-12-03 NOTE — ED NOTES
Provided patient with phone to call daughter. Patient appeared to be too lethargic to dial numbers on phone. Per MD, patient now to see Aaron with ALERT team

## 2019-12-03 NOTE — DISCHARGE PLANNING
Medical Social Work    Referral: Legal Hold    Intervention: Legal Hold Paperwork given to SW by [facility] RN: Joe    Legal Hold Initiated: Date: 12/03/2019  Time: 0130    Legal Hold faxed: Date: 12/03/2019  Time: 0520    Patient’s Insurance Listed on Face Sheet: None    Referrals sent to: [facility]    Plan: Patient will transfer to mental health facility once acceptance is obtained.
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
## 2019-12-03 NOTE — ED NOTES
"Labs collected and sent to lab. Patient states she is feeling \"cold\" and pain is \"all over\" and would like to call her daughter.   "

## 2019-12-04 NOTE — ED NOTES
Pt discharged to home with . Pt was given follow up instructions. Pt verbalized understanding of all instructions for discharge and is ambulatory out of ED with steady gait.

## 2019-12-04 NOTE — ED PROVIDER NOTES
ED PROVIDER NOTE    Scribed for Yanely Brownlee M.D. by Erica Londono. 12/3/2019, 4:07 PM.    This is an addendum to the note on Wally Dwyer. For further details and full chart entry, see the previously signed ED Provider Note written by previous ERP.    12:00 PM - I discussed the patient's case with  previous ERP who will transfer care of the patient to me at this time.        4:08 PM - The patient's continuing management included reevaluation. The patient does not have any acute medical complaints. They will be discharged in stable condition and will return for any new or worsening symptoms.     FINAL IMPRESSION   1. Acute psychosis (HCC)    2. Non compliance w medication regimen        Erica FLETCHER (Scribe), am scribing for, and in the presence of, Yanely Brownlee M.D..    Electronically signed by: Erica Londono (Maryibe), 12/3/2019    IYanely M.D. personally performed the services described in this documentation, as scribed by Erica Londono in my presence, and it is both accurate and complete.    The note accurately reflects work and decisions made by me.  Yanely Brownlee  12/3/2019  8:08 PM

## 2020-04-10 ENCOUNTER — APPOINTMENT (OUTPATIENT)
Dept: RADIOLOGY | Facility: MEDICAL CENTER | Age: 55
End: 2020-04-10
Attending: EMERGENCY MEDICINE
Payer: COMMERCIAL

## 2020-04-10 ENCOUNTER — HOSPITAL ENCOUNTER (EMERGENCY)
Facility: MEDICAL CENTER | Age: 55
End: 2020-04-10
Attending: EMERGENCY MEDICINE
Payer: COMMERCIAL

## 2020-04-10 VITALS
BODY MASS INDEX: 28.53 KG/M2 | OXYGEN SATURATION: 97 % | RESPIRATION RATE: 15 BRPM | HEART RATE: 80 BPM | HEIGHT: 70 IN | DIASTOLIC BLOOD PRESSURE: 83 MMHG | WEIGHT: 199.3 LBS | SYSTOLIC BLOOD PRESSURE: 129 MMHG | TEMPERATURE: 98.4 F

## 2020-04-10 DIAGNOSIS — R00.2 PALPITATIONS: ICD-10-CM

## 2020-04-10 LAB
ALBUMIN SERPL BCP-MCNC: 4.2 G/DL (ref 3.2–4.9)
ALBUMIN/GLOB SERPL: 1.6 G/DL
ALP SERPL-CCNC: 65 U/L (ref 30–99)
ALT SERPL-CCNC: 15 U/L (ref 2–50)
ANION GAP SERPL CALC-SCNC: 13 MMOL/L (ref 7–16)
AST SERPL-CCNC: 17 U/L (ref 12–45)
BASOPHILS # BLD AUTO: 0.7 % (ref 0–1.8)
BASOPHILS # BLD: 0.06 K/UL (ref 0–0.12)
BILIRUB SERPL-MCNC: 0.2 MG/DL (ref 0.1–1.5)
BUN SERPL-MCNC: 17 MG/DL (ref 8–22)
CALCIUM SERPL-MCNC: 9.2 MG/DL (ref 8.5–10.5)
CHLORIDE SERPL-SCNC: 101 MMOL/L (ref 96–112)
CO2 SERPL-SCNC: 24 MMOL/L (ref 20–33)
CREAT SERPL-MCNC: 0.53 MG/DL (ref 0.5–1.4)
EKG IMPRESSION: NORMAL
EOSINOPHIL # BLD AUTO: 0.16 K/UL (ref 0–0.51)
EOSINOPHIL NFR BLD: 2 % (ref 0–6.9)
ERYTHROCYTE [DISTWIDTH] IN BLOOD BY AUTOMATED COUNT: 41.9 FL (ref 35.9–50)
GLOBULIN SER CALC-MCNC: 2.7 G/DL (ref 1.9–3.5)
GLUCOSE SERPL-MCNC: 92 MG/DL (ref 65–99)
HCT VFR BLD AUTO: 41.1 % (ref 37–47)
HGB BLD-MCNC: 13.8 G/DL (ref 12–16)
IMM GRANULOCYTES # BLD AUTO: 0.02 K/UL (ref 0–0.11)
IMM GRANULOCYTES NFR BLD AUTO: 0.2 % (ref 0–0.9)
LYMPHOCYTES # BLD AUTO: 3.34 K/UL (ref 1–4.8)
LYMPHOCYTES NFR BLD: 40.9 % (ref 22–41)
MCH RBC QN AUTO: 29.4 PG (ref 27–33)
MCHC RBC AUTO-ENTMCNC: 33.6 G/DL (ref 33.6–35)
MCV RBC AUTO: 87.4 FL (ref 81.4–97.8)
MONOCYTES # BLD AUTO: 0.62 K/UL (ref 0–0.85)
MONOCYTES NFR BLD AUTO: 7.6 % (ref 0–13.4)
NEUTROPHILS # BLD AUTO: 3.97 K/UL (ref 2–7.15)
NEUTROPHILS NFR BLD: 48.6 % (ref 44–72)
NRBC # BLD AUTO: 0 K/UL
NRBC BLD-RTO: 0 /100 WBC
PLATELET # BLD AUTO: 288 K/UL (ref 164–446)
PMV BLD AUTO: 8.8 FL (ref 9–12.9)
POTASSIUM SERPL-SCNC: 3.9 MMOL/L (ref 3.6–5.5)
PROT SERPL-MCNC: 6.9 G/DL (ref 6–8.2)
RBC # BLD AUTO: 4.7 M/UL (ref 4.2–5.4)
SODIUM SERPL-SCNC: 138 MMOL/L (ref 135–145)
TROPONIN T SERPL-MCNC: 6 NG/L (ref 6–19)
TSH SERPL DL<=0.005 MIU/L-ACNC: 2.18 UIU/ML (ref 0.38–5.33)
WBC # BLD AUTO: 8.2 K/UL (ref 4.8–10.8)

## 2020-04-10 PROCEDURE — 84443 ASSAY THYROID STIM HORMONE: CPT

## 2020-04-10 PROCEDURE — 84484 ASSAY OF TROPONIN QUANT: CPT

## 2020-04-10 PROCEDURE — 99284 EMERGENCY DEPT VISIT MOD MDM: CPT

## 2020-04-10 PROCEDURE — 85025 COMPLETE CBC W/AUTO DIFF WBC: CPT

## 2020-04-10 PROCEDURE — 700102 HCHG RX REV CODE 250 W/ 637 OVERRIDE(OP): Performed by: EMERGENCY MEDICINE

## 2020-04-10 PROCEDURE — 93005 ELECTROCARDIOGRAM TRACING: CPT | Performed by: EMERGENCY MEDICINE

## 2020-04-10 PROCEDURE — 71045 X-RAY EXAM CHEST 1 VIEW: CPT

## 2020-04-10 PROCEDURE — 93005 ELECTROCARDIOGRAM TRACING: CPT

## 2020-04-10 PROCEDURE — A9270 NON-COVERED ITEM OR SERVICE: HCPCS | Performed by: EMERGENCY MEDICINE

## 2020-04-10 PROCEDURE — 80053 COMPREHEN METABOLIC PANEL: CPT

## 2020-04-10 RX ORDER — RISPERIDONE 2 MG/1
2 TABLET ORAL 2 TIMES DAILY
Status: DISCONTINUED | OUTPATIENT
Start: 2020-04-10 | End: 2020-04-10 | Stop reason: HOSPADM

## 2020-04-10 RX ADMIN — RISPERIDONE 2 MG: 2 TABLET ORAL at 18:47

## 2020-04-10 ASSESSMENT — FIBROSIS 4 INDEX: FIB4 SCORE: 0.88

## 2020-04-10 ASSESSMENT — LIFESTYLE VARIABLES: DO YOU DRINK ALCOHOL: NO
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
## 2020-04-11 NOTE — CONSULTS
ALERT team BH note:  Per [facility] ERP DR. Acosta, ED consult to BH not needed but wrap around MH resources to be offered to pt; writer RN reviewed community MH resources with pt, with written information given, including [employer]; Medicaid FFS insurance plan; pt states she has a MH provider at [facility], Dr. Stallings, who prescribes her Risperdal 2 mg PO daily; states her RX ran out; pt denies SI, HI, self-harm ideation; writer RN updated Dr. Acosta; pt to receive a dose of Risperdal 2 mg PO today and DC to self; pt states she has a RX ready to  tonight at [facility]

## 2020-04-11 NOTE — ED PROVIDER NOTES
"ED Provider Note    CHIEF COMPLAINT  \"I am really scared of the black 's.\"    [facility]  Wally Dwyer is a 55 y.o. female who presents with the above chief complaint.  She has a number of complaints.  It sounds like the primary issue is that there is a concern for a spider infestation at her apartment.  Her landlord has asked her to spray rate.  She is allergic to both spiders as well as the rate.  This is caused her to feel very terrible with burning eyes, chest congestion.  She denies any shortness of breath although she does have a cough.  The pain in her chest she describes a pressure, she had associated palpitations.  She is not sure whether she had a fever.  The other day she had some cramping throughout her body including her legs.  She denies any leg pain or swelling presently.  She feels like all this is precipitating her mental illness, her bipolar and PTSD.  However she has been taking her medications.  She denies any runny nose.  Denies any belly pain.  At this point, aside from her chief complaint, she is just very scared about the whole situation of life.  There is no other complaint.    PAST MEDICAL HISTORY  Past Medical History:   Diagnosis Date   • Arthritis    • ASTHMA    • Back pain 11/16/2012   • Blood clot in vein    • Brain injury ([facility])    • Bronchitis    • DVT (deep venous thrombosis) ([facility]) 8/7/2010   • GERD (gastroesophageal reflux disease)    • Hypertension    • Neck pain    • PE (pulmonary embolism) 8/7/2010   • Psychiatric disorder     bipolar   • PTSD (post-traumatic stress disorder)    • Seizure ([facility]) 10/18/2017       FAMILY HISTORY  Family History   Problem Relation Age of Onset   • Hyperlipidemia Mother    • Hypertension Mother    • Diabetes Mother    • Heart Disease Mother    • Diabetes Father    • Hypertension Father    • Heart Disease Father    • DVT Father    • Diabetes Sister    • Other Sister         PTSD   • Lung Disease Neg Hx    • Cancer Neg Hx    • Stroke Neg Hx  " "      SOCIAL HISTORY  Social History     Tobacco Use   • Smoking status: Current Every Day Smoker     Packs/day: 1.00     Years: 20.00     Pack years: 20.00     Types: Cigarettes   • Smokeless tobacco: Never Used   Substance Use Topics   • Alcohol use: No   • Drug use: No         SURGICAL HISTORY  Past Surgical History:   Procedure Laterality Date   • GYN SURGERY      tubal ligation   • PRIMARY C SECTION     • TUBAL COAGULATION LAPAROSCOPIC BILATERAL         CURRENT MEDICATIONS    I have reviewed the nurses notes and/or the list brought with the patient.    ALLERGIES  Allergies   Allergen Reactions   • Haldol [Haloperidol Lactate] Anaphylaxis   • Abilify Shortness of Breath and Swelling   • Bee    • Cogentin [Benztropine Mesylate] Shortness of Breath   • Latuda [Lurasidone Hcl]    • Penicillins    • Tetracycline        REVIEW OF SYSTEMS  See HPI for further details. Review of systems as above, otherwise all other systems are negative.     PHYSICAL EXAM  VITAL SIGNS: /83   Pulse 80   Temp 36.9 °C (98.4 °F) (Temporal)   Resp 15   Ht 1.778 m (5' 10\")   Wt 90.4 kg (199 lb 4.7 oz)   SpO2 97%   BMI 28.60 kg/m²     Constitutional: Well appearing patient in no acute distress.  Not toxic, nor ill in appearance.  HENT: Mucus membranes moist.  Oropharynx is clear.  Eyes: Pupils equally round.  No scleral icterus.   Neck: Full nontender range of motion.  Lymphatic: No cervical lymphadenopathy noted.   Cardiovascular: Regular heart rate and rhythm.  No murmurs, rubs, nor gallop appreciated.   Thorax & Lungs: Chest is nontender.  Lungs are clear to auscultation with good air movement bilaterally.  No wheeze, rhonchi, nor rales.   Abdomen: Soft, with no tenderness, rebound nor guarding.  No mass, pulsatile mass, nor hepatosplenomegaly appreciated.  Skin: No purpura nor petechia noted.  Extremities/Musculoskeletal: No sign of trauma.  Calves are nontender with no cords nor edema.  No Deondre's sign.  Pulses are intact " all around.   Neurologic: Alert & oriented.  Strength and sensation is intact all around.  Gait is normal.  Psychiatric: Normal affect appropriate for the clinical situation.    EKG  I interpreted this EKG myself.  This is a 12-lead study.  The rhythm is sinus with a rate of 86.  There are no ST segment nor T wave abnormalities.  Interpretation: No ST segment elevation myocardial infarction.    LABS  Labs Reviewed   CBC WITH DIFFERENTIAL - Abnormal; Notable for the following components:       Result Value    MPV 8.8 (*)     All other components within normal limits   COMP METABOLIC PANEL   TROPONIN   TSH   ESTIMATED GFR         RADIOLOGY/PROCEDURES  I have reviewed the patient's film interpretations myself, and they are read out by the radiologist as:   DX-CHEST-PORTABLE (1 VIEW)   Final Result      No radiographic evidence of acute cardiopulmonary process.        .    MEDICAL RECORD  I have reviewed patient's medical record and pertinent results are listed above.    COURSE & MEDICAL DECISION MAKING  I have reviewed any medical record information, laboratory studies and radiographic results as noted above.  Patient presents with palpitations and generally not feeling well.  Laboratories are reassuring.  X-ray shows no evidence of an infiltrate.  There is no leukocytosis nor leukopenia.  Chemistries, renal hepatic function are normal.  Troponin is negative.  Given the onset of her symptoms yesterday, and the fact this does not sound cardiac I feel that this is excludes cardiac etiology.  Further EKG is reassuring.  Although clearly she carries a history of pulmonary embolism, the quality of symptoms does not sound suspicious for that.  She has had shows a euthyroid state.  Upon discussion of her normal laboratories and a sandwich, the patient is feeling much better.  She would like to go home.  I like to get her follow-up with her personal doctor.  I did have life skills dropped by to drop off some resources with  her, and she is also quite appreciative of this.  She apparently is just run out of her Risperdal but there is apparently a prescription waiting for her at [facility].  To be given discharge instructions on palpitations asked return here for new symptoms or any turn for the worse.      FINAL IMPRESSION  1. Palpitations           This dictation was created using voice recognition software.    Electronically signed by: Warren Acosta M.D., 4/10/2020 5:55 PM

## 2020-04-11 NOTE — ED TRIAGE NOTES
"Pt to triage, c/o heart palpitations/ leg cramps. States \" she has a history of a blood clot to her right leg and hx of pulmonary embolism. EKG done in triage .  "

## 2020-04-11 NOTE — DISCHARGE INSTRUCTIONS
[facility], as well as with your primary doctor.  Plenty of rest, plenty of fluids.  Return here for new symptoms or any turn for the worse.
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
## 2020-05-15 ENCOUNTER — HOSPITAL ENCOUNTER (EMERGENCY)
Dept: HOSPITAL 8 - ED | Age: 55
Discharge: HOME | End: 2020-05-15
Payer: MEDICAID

## 2020-05-15 VITALS — WEIGHT: 200.4 LBS | HEIGHT: 70 IN | BODY MASS INDEX: 28.69 KG/M2

## 2020-05-15 VITALS — SYSTOLIC BLOOD PRESSURE: 136 MMHG | DIASTOLIC BLOOD PRESSURE: 81 MMHG

## 2020-05-15 DIAGNOSIS — F17.200: ICD-10-CM

## 2020-05-15 DIAGNOSIS — L03.116: Primary | ICD-10-CM

## 2020-05-15 DIAGNOSIS — I10: ICD-10-CM

## 2020-05-15 DIAGNOSIS — G89.29: ICD-10-CM

## 2020-05-15 DIAGNOSIS — Z86.718: ICD-10-CM

## 2020-05-15 DIAGNOSIS — G43.909: ICD-10-CM

## 2020-05-15 DIAGNOSIS — G40.909: ICD-10-CM

## 2020-05-15 PROCEDURE — 93970 EXTREMITY STUDY: CPT

## 2020-05-15 PROCEDURE — 99284 EMERGENCY DEPT VISIT MOD MDM: CPT

## 2020-06-17 ENCOUNTER — APPOINTMENT (OUTPATIENT)
Dept: RADIOLOGY | Facility: MEDICAL CENTER | Age: 55
End: 2020-06-17
Attending: EMERGENCY MEDICINE
Payer: COMMERCIAL

## 2020-06-17 ENCOUNTER — HOSPITAL ENCOUNTER (EMERGENCY)
Facility: MEDICAL CENTER | Age: 55
End: 2020-06-18
Attending: EMERGENCY MEDICINE
Payer: COMMERCIAL
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
## 2020-06-17 DIAGNOSIS — R07.89 CHEST WALL PAIN: ICD-10-CM

## 2020-06-17 DIAGNOSIS — T07.XXXA ABRASIONS OF MULTIPLE SITES: ICD-10-CM

## 2020-06-17 PROCEDURE — 700111 HCHG RX REV CODE 636 W/ 250 OVERRIDE (IP): Performed by: EMERGENCY MEDICINE

## 2020-06-17 PROCEDURE — 700101 HCHG RX REV CODE 250: Performed by: EMERGENCY MEDICINE

## 2020-06-17 PROCEDURE — 700102 HCHG RX REV CODE 250 W/ 637 OVERRIDE(OP): Performed by: EMERGENCY MEDICINE

## 2020-06-17 PROCEDURE — 99284 EMERGENCY DEPT VISIT MOD MDM: CPT

## 2020-06-17 PROCEDURE — A9270 NON-COVERED ITEM OR SERVICE: HCPCS | Performed by: EMERGENCY MEDICINE

## 2020-06-17 PROCEDURE — 81003 URINALYSIS AUTO W/O SCOPE: CPT

## 2020-06-17 PROCEDURE — 71046 X-RAY EXAM CHEST 2 VIEWS: CPT

## 2020-06-17 RX ORDER — ACETAMINOPHEN 500 MG
1000 TABLET ORAL ONCE
Status: COMPLETED | OUTPATIENT
Start: 2020-06-17 | End: 2020-06-17

## 2020-06-17 RX ORDER — IBUPROFEN 600 MG/1
600 TABLET ORAL ONCE
Status: COMPLETED | OUTPATIENT
Start: 2020-06-17 | End: 2020-06-17

## 2020-06-17 RX ORDER — LIDOCAINE 50 MG/G
1 PATCH TOPICAL ONCE
Status: DISCONTINUED | OUTPATIENT
Start: 2020-06-17 | End: 2020-06-18 | Stop reason: HOSPADM

## 2020-06-17 RX ORDER — LIDOCAINE HYDROCHLORIDE 20 MG/ML
5 SOLUTION OROPHARYNGEAL ONCE
Status: COMPLETED | OUTPATIENT
Start: 2020-06-17 | End: 2020-06-17

## 2020-06-17 RX ORDER — ONDANSETRON 4 MG/1
4 TABLET, ORALLY DISINTEGRATING ORAL ONCE
Status: COMPLETED | OUTPATIENT
Start: 2020-06-17 | End: 2020-06-17

## 2020-06-17 RX ADMIN — ACETAMINOPHEN 1000 MG: 500 TABLET ORAL at 23:21

## 2020-06-17 RX ADMIN — IBUPROFEN 600 MG: 600 TABLET ORAL at 23:21

## 2020-06-17 RX ADMIN — ONDANSETRON 4 MG: 4 TABLET, ORALLY DISINTEGRATING ORAL at 23:21

## 2020-06-17 RX ADMIN — LIDOCAINE 1 PATCH: 50 PATCH TOPICAL at 23:44

## 2020-06-17 RX ADMIN — LIDOCAINE HYDROCHLORIDE 5 ML: 20 SOLUTION ORAL; TOPICAL at 23:21

## 2020-06-17 ASSESSMENT — FIBROSIS 4 INDEX: FIB4 SCORE: 0.81

## 2020-06-17 ASSESSMENT — LIFESTYLE VARIABLES: DO YOU DRINK ALCOHOL: NO

## 2020-06-18 VITALS
OXYGEN SATURATION: 93 % | RESPIRATION RATE: 20 BRPM | HEIGHT: 70 IN | WEIGHT: 205 LBS | TEMPERATURE: 98.1 F | BODY MASS INDEX: 29.35 KG/M2 | DIASTOLIC BLOOD PRESSURE: 74 MMHG | HEART RATE: 82 BPM | SYSTOLIC BLOOD PRESSURE: 150 MMHG

## 2020-06-18 LAB
APPEARANCE UR: CLEAR
BILIRUB UR QL STRIP.AUTO: NEGATIVE
COLOR UR: YELLOW
GLUCOSE UR STRIP.AUTO-MCNC: NEGATIVE MG/DL
KETONES UR STRIP.AUTO-MCNC: NEGATIVE MG/DL
LEUKOCYTE ESTERASE UR QL STRIP.AUTO: NEGATIVE
MICRO URNS: NORMAL
NITRITE UR QL STRIP.AUTO: NEGATIVE
PH UR STRIP.AUTO: 6 [PH] (ref 5–8)
PROT UR QL STRIP: NEGATIVE MG/DL
RBC UR QL AUTO: NEGATIVE
SP GR UR STRIP.AUTO: 1.02
UROBILINOGEN UR STRIP.AUTO-MCNC: 0.2 MG/DL

## 2020-06-18 RX ORDER — LIDOCAINE 50 MG/G
1 PATCH TOPICAL EVERY 24 HOURS
Qty: 10 PATCH | Refills: 0 | Status: SHIPPED | OUTPATIENT
Start: 2020-06-18 | End: 2020-08-04

## 2020-06-18 NOTE — ED NOTES
Break RN: Pt eloped ambulatory with steady gait to lobby. Pt left room in view of this RN and refused to talk to this RN. Pt left without discharge paperwork and would not stop to talk to this RN to wait for paperwork. Pt in possession of all belongings. Charge RN notified.
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
## 2020-08-04 ENCOUNTER — HOSPITAL ENCOUNTER (EMERGENCY)
Facility: MEDICAL CENTER | Age: 55
End: 2020-08-04
Payer: COMMERCIAL

## 2020-08-04 VITALS
RESPIRATION RATE: 17 BRPM | WEIGHT: 198.19 LBS | HEIGHT: 70 IN | HEART RATE: 82 BPM | SYSTOLIC BLOOD PRESSURE: 131 MMHG | BODY MASS INDEX: 28.37 KG/M2 | OXYGEN SATURATION: 97 % | TEMPERATURE: 97.3 F | DIASTOLIC BLOOD PRESSURE: 86 MMHG

## 2020-08-04 LAB — EKG IMPRESSION: NORMAL

## 2020-08-04 PROCEDURE — 302449 STATCHG TRIAGE ONLY (STATISTIC)

## 2020-08-04 PROCEDURE — 93005 ELECTROCARDIOGRAM TRACING: CPT

## 2020-08-04 RX ORDER — PRAZOSIN HYDROCHLORIDE 1 MG/1
1 CAPSULE ORAL NIGHTLY
COMMUNITY

## 2020-08-04 RX ORDER — ZIPRASIDONE HYDROCHLORIDE 80 MG/1
80 CAPSULE ORAL 2 TIMES DAILY
COMMUNITY

## 2020-08-04 RX ORDER — SUMATRIPTAN 50 MG/1
50 TABLET, FILM COATED ORAL
COMMUNITY

## 2020-08-04 RX ORDER — LISINOPRIL 10 MG/1
10 TABLET ORAL DAILY
COMMUNITY

## 2020-08-04 RX ORDER — HYDROXYZINE 50 MG/1
50 TABLET, FILM COATED ORAL 3 TIMES DAILY PRN
COMMUNITY

## 2020-08-04 ASSESSMENT — FIBROSIS 4 INDEX: FIB4 SCORE: 0.81

## 2020-08-04 ASSESSMENT — PAIN DESCRIPTION - DESCRIPTORS: DESCRIPTORS: STABBING
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
## 2020-08-05 NOTE — ED NOTES
Pt states that their ride was here and wanted to leave without being seen today. Pt signed the AMA form and will be dismissed.

## 2020-09-11 ENCOUNTER — HOSPITAL ENCOUNTER (EMERGENCY)
Facility: MEDICAL CENTER | Age: 55
End: 2020-09-11
Attending: EMERGENCY MEDICINE
Payer: COMMERCIAL

## 2020-09-11 VITALS
RESPIRATION RATE: 18 BRPM | HEIGHT: 70 IN | DIASTOLIC BLOOD PRESSURE: 55 MMHG | TEMPERATURE: 98.2 F | OXYGEN SATURATION: 95 % | HEART RATE: 68 BPM | SYSTOLIC BLOOD PRESSURE: 117 MMHG | BODY MASS INDEX: 28.63 KG/M2 | WEIGHT: 200 LBS

## 2020-09-11 DIAGNOSIS — G43.009 MIGRAINE WITHOUT AURA AND WITHOUT STATUS MIGRAINOSUS, NOT INTRACTABLE: ICD-10-CM

## 2020-09-11 PROCEDURE — 99284 EMERGENCY DEPT VISIT MOD MDM: CPT

## 2020-09-11 PROCEDURE — 700111 HCHG RX REV CODE 636 W/ 250 OVERRIDE (IP): Performed by: EMERGENCY MEDICINE

## 2020-09-11 PROCEDURE — 96374 THER/PROPH/DIAG INJ IV PUSH: CPT

## 2020-09-11 PROCEDURE — 96375 TX/PRO/DX INJ NEW DRUG ADDON: CPT

## 2020-09-11 RX ORDER — KETOROLAC TROMETHAMINE 30 MG/ML
30 INJECTION, SOLUTION INTRAMUSCULAR; INTRAVENOUS ONCE
Status: COMPLETED | OUTPATIENT
Start: 2020-09-11 | End: 2020-09-11

## 2020-09-11 RX ORDER — DEXAMETHASONE SODIUM PHOSPHATE 10 MG/ML
10 INJECTION, SOLUTION INTRAMUSCULAR; INTRAVENOUS ONCE
Status: COMPLETED | OUTPATIENT
Start: 2020-09-11 | End: 2020-09-11

## 2020-09-11 RX ORDER — PROCHLORPERAZINE EDISYLATE 5 MG/ML
10 INJECTION INTRAMUSCULAR; INTRAVENOUS ONCE
Status: COMPLETED | OUTPATIENT
Start: 2020-09-11 | End: 2020-09-11

## 2020-09-11 RX ORDER — DIPHENHYDRAMINE HYDROCHLORIDE 50 MG/ML
12.5 INJECTION INTRAMUSCULAR; INTRAVENOUS ONCE
Status: COMPLETED | OUTPATIENT
Start: 2020-09-11 | End: 2020-09-11

## 2020-09-11 RX ORDER — BUTALBITAL, ACETAMINOPHEN AND CAFFEINE 50; 325; 40 MG/1; MG/1; MG/1
1 TABLET ORAL EVERY 4 HOURS PRN
Qty: 30 TAB | Refills: 0 | Status: SHIPPED | OUTPATIENT
Start: 2020-09-11 | End: 2020-09-18

## 2020-09-11 RX ADMIN — DEXAMETHASONE SODIUM PHOSPHATE 10 MG: 10 INJECTION INTRAMUSCULAR; INTRAVENOUS at 07:15

## 2020-09-11 RX ADMIN — DIPHENHYDRAMINE HYDROCHLORIDE 12.5 MG: 50 INJECTION INTRAMUSCULAR; INTRAVENOUS at 07:16

## 2020-09-11 RX ADMIN — KETOROLAC TROMETHAMINE 30 MG: 30 INJECTION, SOLUTION INTRAMUSCULAR at 07:15

## 2020-09-11 RX ADMIN — PROCHLORPERAZINE EDISYLATE 10 MG: 5 INJECTION INTRAMUSCULAR; INTRAVENOUS at 07:15

## 2020-09-11 ASSESSMENT — FIBROSIS 4 INDEX: FIB4 SCORE: 0.81

## 2020-09-11 NOTE — ED TRIAGE NOTES
"Chief Complaint   Patient presents with   • Headache   • Weakness       BIB EMS to [facility], pt on monitor and in gown. Pt consists of: Migraine for a couple of weeks and weakness. Pt came from home by EMS. Per EMS pt fell a couple of weeks ago from the toilet, pt hit her head and neck. Pt stating has not received imaging for fall. Pt experiencing neck and back pain. Pt refused PIV per EMS and pain medication. Pt refusing PIV at this time.     Medications given en route:N/A    BP (!) 166/86   Pulse 74   Temp 36.4 °C (97.6 °F) (Temporal)   Ht 1.778 m (5' 10\")   Wt 90.7 kg (200 lb)   LMP 11/13/2012   SpO2 92%   BMI 28.70 kg/m²   "

## 2020-09-11 NOTE — ED NOTES
Pt refused to sign DC. Pt tried tried to walk out w/ her IV still in. Pt was finally stooped by main entrance and allowed iv to be pulled.
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
## 2020-09-11 NOTE — DISCHARGE INSTRUCTIONS
Migraine Headache    Take ibuprofen and Imitrex or Fioricet immediately at the first hint of headache.  Add the other medicine if not improving in 1 hour.  Return for sudden onset, severe headache, headache and fever or headache and weakness.    You had a borderline or high normal blood pressure reading today.  This does not necessarily mean you have hypertension.  Please followup with your/a primary physician for comprehensive blood pressure evaluation and yearly fasting cholesterol assessment.  BP Readings from Last 3 Encounters:   09/11/20 145/67   08/04/20 131/86   06/18/20 150/74         You have a migraine headache. Symptoms of migraines include a throbbing headache, made worse by activity. Sometimes only one side of the head hurts. Nausea, vomiting and sinus pain or stuffiness is common with migraines. Visual symptoms such as light sensitivity, blind spots, or flashing lights may also occur. Loud noises may make migraine pain worse. Migraine headaches are caused by changes in the size of the blood vessels in the head and neck. Many factors may cause this problem including:  Emotional stress, lack of sleep, and menstrual periods.  Alcohol and some drugs (such as birth control pills).  Diet factors (fasting, caffeine, food preservatives, chocolate).  Environmental factors (weather changes, bright lights, odors, smoke).  Jarring motions and loud noises may also bring on a migraine. Prevention of migraines includes dealing with the trigger factors.    Treatment may require medicines for pain, inflammation, and vomiting. Injections for pain and IV fluids can be used if the headache is very severe, or if you are vomiting repeatedly. Get plenty of rest over the next 24 hours.  Lie down in a quiet, dark place and try to sleep  Medicines to prevent the blood vessel changes may be prescribed.  For people with frequent migraines, other medicines such as beta blockers and antidepressants may be helpful. Please see your  caregiver if you are not better in 1-2 days.     SEEK IMMEDIATE MEDICAL CARE IF:  You develop a high fever, repeated vomiting, dehydration, or extreme weakness  You develop fainting, worsening headache, confusion, or seizures  You develop numbness or weakness of the limbs    [employer]® Patient Information ©2007 [employer].
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
## 2020-09-11 NOTE — ED NOTES
RN explained to patient medication orders, pt refusing to answer RN, Pt covering face with blanket and crying. RN educated pt will need a PIV for medications, pt refusing to consent at this time for PIV. Pt refusing to respond to RN. RN discussed with patient will try again.

## 2020-09-26 ENCOUNTER — APPOINTMENT (OUTPATIENT)
Dept: RADIOLOGY | Facility: MEDICAL CENTER | Age: 55
End: 2020-09-26
Attending: EMERGENCY MEDICINE
Payer: COMMERCIAL

## 2020-09-26 ENCOUNTER — HOSPITAL ENCOUNTER (EMERGENCY)
Facility: MEDICAL CENTER | Age: 55
End: 2020-09-26
Attending: EMERGENCY MEDICINE
Payer: COMMERCIAL

## 2020-09-26 VITALS
SYSTOLIC BLOOD PRESSURE: 139 MMHG | WEIGHT: 205 LBS | DIASTOLIC BLOOD PRESSURE: 71 MMHG | TEMPERATURE: 97 F | RESPIRATION RATE: 16 BRPM | OXYGEN SATURATION: 95 % | HEART RATE: 84 BPM | HEIGHT: 70 IN | BODY MASS INDEX: 29.35 KG/M2
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
## 2020-09-26 DIAGNOSIS — S16.1XXA STRAIN OF NECK MUSCLE, INITIAL ENCOUNTER: ICD-10-CM

## 2020-09-26 DIAGNOSIS — T07.XXXA MULTIPLE CONTUSIONS: ICD-10-CM

## 2020-09-26 PROCEDURE — 99284 EMERGENCY DEPT VISIT MOD MDM: CPT

## 2020-09-26 PROCEDURE — 71101 X-RAY EXAM UNILAT RIBS/CHEST: CPT | Mod: LT

## 2020-09-26 PROCEDURE — 70450 CT HEAD/BRAIN W/O DYE: CPT

## 2020-09-26 PROCEDURE — 96372 THER/PROPH/DIAG INJ SC/IM: CPT

## 2020-09-26 PROCEDURE — 72125 CT NECK SPINE W/O DYE: CPT

## 2020-09-26 PROCEDURE — 700111 HCHG RX REV CODE 636 W/ 250 OVERRIDE (IP): Performed by: EMERGENCY MEDICINE

## 2020-09-26 RX ORDER — OXYCODONE HYDROCHLORIDE AND ACETAMINOPHEN 5; 325 MG/1; MG/1
1 TABLET ORAL ONCE
Status: DISCONTINUED | OUTPATIENT
Start: 2020-09-26 | End: 2020-09-26 | Stop reason: HOSPADM

## 2020-09-26 RX ORDER — NAPROXEN 500 MG/1
500 TABLET ORAL 2 TIMES DAILY WITH MEALS
Qty: 20 TAB | Refills: 0 | Status: SHIPPED | OUTPATIENT
Start: 2020-09-26

## 2020-09-26 RX ORDER — KETOROLAC TROMETHAMINE 30 MG/ML
60 INJECTION, SOLUTION INTRAMUSCULAR; INTRAVENOUS ONCE
Status: COMPLETED | OUTPATIENT
Start: 2020-09-26 | End: 2020-09-26

## 2020-09-26 RX ADMIN — KETOROLAC TROMETHAMINE 60 MG: 30 INJECTION, SOLUTION INTRAMUSCULAR at 20:12

## 2020-09-26 ASSESSMENT — LIFESTYLE VARIABLES
TOTAL SCORE: 0
HAVE YOU EVER FELT YOU SHOULD CUT DOWN ON YOUR DRINKING: NO
CONSUMPTION TOTAL: INCOMPLETE
TOTAL SCORE: 0
DO YOU DRINK ALCOHOL: YES
HAVE PEOPLE ANNOYED YOU BY CRITICIZING YOUR DRINKING: NO
EVER HAD A DRINK FIRST THING IN THE MORNING TO STEADY YOUR NERVES TO GET RID OF A HANGOVER: NO
EVER FELT BAD OR GUILTY ABOUT YOUR DRINKING: NO
TOTAL SCORE: 0

## 2020-09-26 ASSESSMENT — FIBROSIS 4 INDEX: FIB4 SCORE: 0.81
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
## 2020-10-25 ENCOUNTER — HOSPITAL ENCOUNTER (EMERGENCY)
Facility: MEDICAL CENTER | Age: 55
End: 2020-10-25
Attending: EMERGENCY MEDICINE
Payer: COMMERCIAL

## 2020-10-25 VITALS
DIASTOLIC BLOOD PRESSURE: 80 MMHG | RESPIRATION RATE: 18 BRPM | HEIGHT: 70 IN | OXYGEN SATURATION: 98 % | SYSTOLIC BLOOD PRESSURE: 130 MMHG | WEIGHT: 205 LBS | TEMPERATURE: 98.2 F | HEART RATE: 98 BPM | BODY MASS INDEX: 29.35 KG/M2

## 2020-10-25 DIAGNOSIS — T74.21XA SEXUAL ASSAULT OF ADULT, INITIAL ENCOUNTER: ICD-10-CM

## 2020-10-25 DIAGNOSIS — Z20.2 POSSIBLE EXPOSURE TO STD: ICD-10-CM

## 2020-10-25 LAB
ALBUMIN SERPL BCP-MCNC: 4.7 G/DL (ref 3.2–4.9)
ALBUMIN/GLOB SERPL: 1.6 G/DL
ALP SERPL-CCNC: 75 U/L (ref 30–99)
ALT SERPL-CCNC: 13 U/L (ref 2–50)
ANION GAP SERPL CALC-SCNC: 11 MMOL/L (ref 7–16)
AST SERPL-CCNC: 16 U/L (ref 12–45)
BILIRUB SERPL-MCNC: 0.4 MG/DL (ref 0.1–1.5)
BUN SERPL-MCNC: 12 MG/DL (ref 8–22)
CALCIUM SERPL-MCNC: 9.6 MG/DL (ref 8.5–10.5)
CHLORIDE SERPL-SCNC: 96 MMOL/L (ref 96–112)
CO2 SERPL-SCNC: 26 MMOL/L (ref 20–33)
CREAT SERPL-MCNC: 0.48 MG/DL (ref 0.5–1.4)
ERYTHROCYTE [DISTWIDTH] IN BLOOD BY AUTOMATED COUNT: 43.4 FL (ref 35.9–50)
GLOBULIN SER CALC-MCNC: 2.9 G/DL (ref 1.9–3.5)
GLUCOSE SERPL-MCNC: 110 MG/DL (ref 65–99)
HBV CORE AB SERPL QL IA: NONREACTIVE
HBV SURFACE AB SERPL IA-ACNC: 36.2 MIU/ML (ref 0–10)
HBV SURFACE AG SER QL: ABNORMAL
HCT VFR BLD AUTO: 42.1 % (ref 37–47)
HCV AB SER QL: ABNORMAL
HGB BLD-MCNC: 14.4 G/DL (ref 12–16)
HIV 1+2 AB+HIV1 P24 AG SERPL QL IA: ABNORMAL
MCH RBC QN AUTO: 31.2 PG (ref 27–33)
MCHC RBC AUTO-ENTMCNC: 34.2 G/DL (ref 33.6–35)
MCV RBC AUTO: 91.1 FL (ref 81.4–97.8)
PLATELET # BLD AUTO: 261 K/UL (ref 164–446)
PMV BLD AUTO: 8.9 FL (ref 9–12.9)
POTASSIUM SERPL-SCNC: 3.6 MMOL/L (ref 3.6–5.5)
PROT SERPL-MCNC: 7.6 G/DL (ref 6–8.2)
RBC # BLD AUTO: 4.62 M/UL (ref 4.2–5.4)
SODIUM SERPL-SCNC: 133 MMOL/L (ref 135–145)
TREPONEMA PALLIDUM IGG+IGM AB [PRESENCE] IN SERUM OR PLASMA BY IMMUNOASSAY: NORMAL
WBC # BLD AUTO: 10.3 K/UL (ref 4.8–10.8)

## 2020-10-25 PROCEDURE — 87340 HEPATITIS B SURFACE AG IA: CPT

## 2020-10-25 PROCEDURE — 80053 COMPREHEN METABOLIC PANEL: CPT

## 2020-10-25 PROCEDURE — 99284 EMERGENCY DEPT VISIT MOD MDM: CPT

## 2020-10-25 PROCEDURE — 86803 HEPATITIS C AB TEST: CPT

## 2020-10-25 PROCEDURE — 87591 N.GONORRHOEAE DNA AMP PROB: CPT

## 2020-10-25 PROCEDURE — 700111 HCHG RX REV CODE 636 W/ 250 OVERRIDE (IP): Performed by: EMERGENCY MEDICINE

## 2020-10-25 PROCEDURE — A9270 NON-COVERED ITEM OR SERVICE: HCPCS | Performed by: EMERGENCY MEDICINE

## 2020-10-25 PROCEDURE — 85027 COMPLETE CBC AUTOMATED: CPT

## 2020-10-25 PROCEDURE — 87491 CHLMYD TRACH DNA AMP PROBE: CPT

## 2020-10-25 PROCEDURE — 96372 THER/PROPH/DIAG INJ SC/IM: CPT

## 2020-10-25 PROCEDURE — 86780 TREPONEMA PALLIDUM: CPT

## 2020-10-25 PROCEDURE — 700102 HCHG RX REV CODE 250 W/ 637 OVERRIDE(OP): Performed by: EMERGENCY MEDICINE

## 2020-10-25 PROCEDURE — 86704 HEP B CORE ANTIBODY TOTAL: CPT

## 2020-10-25 PROCEDURE — 87389 HIV-1 AG W/HIV-1&-2 AB AG IA: CPT

## 2020-10-25 PROCEDURE — 86706 HEP B SURFACE ANTIBODY: CPT

## 2020-10-25 RX ORDER — CEFTRIAXONE SODIUM 250 MG/1
250 INJECTION, POWDER, FOR SOLUTION INTRAMUSCULAR; INTRAVENOUS ONCE
Status: COMPLETED | OUTPATIENT
Start: 2020-10-25 | End: 2020-10-25

## 2020-10-25 RX ORDER — AZITHROMYCIN 250 MG/1
1000 TABLET, FILM COATED ORAL ONCE
Status: COMPLETED | OUTPATIENT
Start: 2020-10-25 | End: 2020-10-25

## 2020-10-25 RX ORDER — EMTRICITABINE AND TENOFOVIR DISOPROXIL FUMARATE 200; 300 MG/1; MG/1
1 TABLET, FILM COATED ORAL DAILY
Qty: 28 TAB | Refills: 0 | Status: SHIPPED | OUTPATIENT
Start: 2020-10-25 | End: 2020-11-22

## 2020-10-25 RX ORDER — EMTRICITABINE AND TENOFOVIR DISOPROXIL FUMARATE 200; 300 MG/1; MG/1
1 TABLET, FILM COATED ORAL ONCE
Status: COMPLETED | OUTPATIENT
Start: 2020-10-25 | End: 2020-10-25

## 2020-10-25 RX ORDER — METRONIDAZOLE 500 MG/1
2000 TABLET ORAL ONCE
Status: COMPLETED | OUTPATIENT
Start: 2020-10-25 | End: 2020-10-25

## 2020-10-25 RX ORDER — RALTEGRAVIR 400 MG/1
400 TABLET, FILM COATED ORAL 2 TIMES DAILY
Qty: 56 TAB | Refills: 0 | Status: SHIPPED | OUTPATIENT
Start: 2020-10-25 | End: 2020-11-22

## 2020-10-25 RX ADMIN — METRONIDAZOLE 2000 MG: 500 TABLET ORAL at 22:32

## 2020-10-25 RX ADMIN — EMTRICITABINE AND TENOFOVIR DISOPROXIL FUMARATE 1 TABLET: 200; 300 TABLET, FILM COATED ORAL at 22:31

## 2020-10-25 RX ADMIN — CEFTRIAXONE SODIUM 250 MG: 250 INJECTION, POWDER, FOR SOLUTION INTRAMUSCULAR; INTRAVENOUS at 22:32

## 2020-10-25 RX ADMIN — RALTEGRAVIR 400 MG: 400 TABLET, FILM COATED ORAL at 22:31

## 2020-10-25 RX ADMIN — AZITHROMYCIN MONOHYDRATE 1000 MG: 250 TABLET ORAL at 22:31

## 2020-10-25 ASSESSMENT — FIBROSIS 4 INDEX: FIB4 SCORE: 0.81

## 2020-10-26 LAB
C TRACH DNA SPEC QL NAA+PROBE: NEGATIVE
N GONORRHOEA DNA SPEC QL NAA+PROBE: NEGATIVE
SPECIMEN SOURCE: NORMAL
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
## 2023-06-22 ENCOUNTER — APPOINTMENT (OUTPATIENT)
Dept: RADIOLOGY | Facility: MEDICAL CENTER | Age: 58
End: 2023-06-22
Attending: EMERGENCY MEDICINE
Payer: MEDICAID

## 2023-06-22 ENCOUNTER — HOSPITAL ENCOUNTER (EMERGENCY)
Facility: MEDICAL CENTER | Age: 58
End: 2023-06-22
Attending: EMERGENCY MEDICINE
Payer: MEDICAID

## 2023-06-22 VITALS
SYSTOLIC BLOOD PRESSURE: 94 MMHG | RESPIRATION RATE: 16 BRPM | HEART RATE: 87 BPM | OXYGEN SATURATION: 92 % | DIASTOLIC BLOOD PRESSURE: 67 MMHG | WEIGHT: 200 LBS | TEMPERATURE: 98.4 F | HEIGHT: 70 IN | BODY MASS INDEX: 28.63 KG/M2

## 2023-06-22 DIAGNOSIS — M25.531 RIGHT WRIST PAIN: ICD-10-CM

## 2023-06-22 DIAGNOSIS — Z00.8 MEDICAL CLEARANCE FOR INCARCERATION: ICD-10-CM

## 2023-06-22 PROCEDURE — 700102 HCHG RX REV CODE 250 W/ 637 OVERRIDE(OP): Mod: UD | Performed by: EMERGENCY MEDICINE

## 2023-06-22 PROCEDURE — 73110 X-RAY EXAM OF WRIST: CPT | Mod: RT

## 2023-06-22 PROCEDURE — 70450 CT HEAD/BRAIN W/O DYE: CPT

## 2023-06-22 PROCEDURE — 99284 EMERGENCY DEPT VISIT MOD MDM: CPT

## 2023-06-22 PROCEDURE — A9270 NON-COVERED ITEM OR SERVICE: HCPCS | Mod: UD | Performed by: EMERGENCY MEDICINE

## 2023-06-22 RX ORDER — LORAZEPAM 2 MG/1
2 TABLET ORAL ONCE
Status: COMPLETED | OUTPATIENT
Start: 2023-06-22 | End: 2023-06-22

## 2023-06-22 RX ORDER — HYDROCODONE BITARTRATE AND ACETAMINOPHEN 5; 325 MG/1; MG/1
1 TABLET ORAL ONCE
Status: COMPLETED | OUTPATIENT
Start: 2023-06-22 | End: 2023-06-22

## 2023-06-22 RX ADMIN — LORAZEPAM 2 MG: 2 TABLET ORAL at 18:20

## 2023-06-22 RX ADMIN — HYDROCODONE BITARTRATE AND ACETAMINOPHEN 1 TABLET: 5; 325 TABLET ORAL at 18:08

## 2023-06-23 NOTE — ED NOTES
"Pt discharged to PD. Pt in possession of belongings. Pt provided discharge education and information pertaining to medications and follow up appointments. Pt received copy of discharge instructions and verbalized understanding. BP 94/67 Comment: sleeping  Pulse 87   Temp 36.9 °C (98.4 °F) (Temporal)   Resp 16   Ht 1.778 m (5' 10\")   Wt 90.7 kg (200 lb)   LMP 11/13/2012   SpO2 92%   BMI 28.70 kg/m²     "

## 2023-06-23 NOTE — ED NOTES
New pharmacy.  Routing refill request to provider for review/approval because:  Drug interaction warning    Alysa Lombardo BSN, RN           Pt to CT.
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
## 2023-06-23 NOTE — ED NOTES
Patient verbally aggressive. Upset that she cannot use a phone to call her mom. Patient under police custody. [employer] confirmed patient cant use a phone. ERP notified. Came to bedside.
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
## 2024-01-31 ENCOUNTER — HOSPITAL ENCOUNTER (EMERGENCY)
Facility: MEDICAL CENTER | Age: 59
End: 2024-01-31
Payer: MEDICAID

## 2024-01-31 VITALS
RESPIRATION RATE: 16 BRPM | SYSTOLIC BLOOD PRESSURE: 145 MMHG | BODY MASS INDEX: 27.36 KG/M2 | OXYGEN SATURATION: 97 % | HEART RATE: 85 BPM | DIASTOLIC BLOOD PRESSURE: 80 MMHG | HEIGHT: 70 IN | WEIGHT: 191.14 LBS | TEMPERATURE: 97.3 F

## 2024-01-31 PROCEDURE — 302449 STATCHG TRIAGE ONLY (STATISTIC)

## 2024-02-01 NOTE — ED TRIAGE NOTES
"Wally Valverde Clahelena Dwyer  59 y.o. female  Chief Complaint   Patient presents with    Other     Reports \" I'm stuck in [place]\" unable to call family to drive her home to [place]. Out of seizure medication.      Pt ambulatory to triage with steady gait for above complaint.     Pt is GCS 15, speaking in full sentences, follows commands and responds appropriately to questions. Resp are even and unlabored.     Pt placed in ED lobby. Pt educated on triage process. Pt encouraged to alert staff for any changes.       Vitals:    01/31/24 1952   BP: (!) 145/80   Pulse: 85   Resp: 16   Temp: 36.3 °C (97.3 °F)   SpO2: 97%     "

## 2024-07-07 ENCOUNTER — HOSPITAL ENCOUNTER (EMERGENCY)
Facility: MEDICAL CENTER | Age: 59
End: 2024-07-07
Attending: EMERGENCY MEDICINE
Payer: MEDICAID

## 2024-07-07 ENCOUNTER — APPOINTMENT (OUTPATIENT)
Dept: RADIOLOGY | Facility: MEDICAL CENTER | Age: 59
End: 2024-07-07
Payer: MEDICAID

## 2024-07-07 ENCOUNTER — APPOINTMENT (OUTPATIENT)
Dept: RADIOLOGY | Facility: MEDICAL CENTER | Age: 59
End: 2024-07-07
Attending: EMERGENCY MEDICINE
Payer: MEDICAID

## 2024-07-07 VITALS
BODY MASS INDEX: 20.04 KG/M2 | HEIGHT: 70 IN | WEIGHT: 140 LBS | DIASTOLIC BLOOD PRESSURE: 56 MMHG | RESPIRATION RATE: 16 BRPM | OXYGEN SATURATION: 94 % | HEART RATE: 63 BPM | SYSTOLIC BLOOD PRESSURE: 96 MMHG | TEMPERATURE: 97.9 F

## 2024-07-07 DIAGNOSIS — S16.1XXA STRAIN OF NECK MUSCLE, INITIAL ENCOUNTER: ICD-10-CM

## 2024-07-07 DIAGNOSIS — S09.90XA CLOSED HEAD INJURY, INITIAL ENCOUNTER: ICD-10-CM
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
## 2024-07-07 LAB
ABO + RH BLD: NORMAL
ABO GROUP BLD: NORMAL
ALBUMIN SERPL BCP-MCNC: 3.3 G/DL (ref 3.2–4.9)
ALBUMIN/GLOB SERPL: 1.4 G/DL
ALP SERPL-CCNC: 63 U/L (ref 30–99)
ALT SERPL-CCNC: 14 U/L (ref 2–50)
ANION GAP SERPL CALC-SCNC: 7 MMOL/L (ref 7–16)
APTT PPP: 24.5 SEC (ref 24.7–36)
AST SERPL-CCNC: 27 U/L (ref 12–45)
BILIRUB SERPL-MCNC: 0.4 MG/DL (ref 0.1–1.5)
BLD GP AB SCN SERPL QL: NORMAL
BUN SERPL-MCNC: 16 MG/DL (ref 8–22)
CALCIUM ALBUM COR SERPL-MCNC: 8.8 MG/DL (ref 8.5–10.5)
CALCIUM SERPL-MCNC: 8.2 MG/DL (ref 8.5–10.5)
CHLORIDE SERPL-SCNC: 108 MMOL/L (ref 96–112)
CO2 SERPL-SCNC: 24 MMOL/L (ref 20–33)
CREAT SERPL-MCNC: 0.43 MG/DL (ref 0.5–1.4)
ERYTHROCYTE [DISTWIDTH] IN BLOOD BY AUTOMATED COUNT: 44 FL (ref 35.9–50)
ETHANOL BLD-MCNC: <10.1 MG/DL
GFR SERPLBLD CREATININE-BSD FMLA CKD-EPI: 113 ML/MIN/1.73 M 2
GLOBULIN SER CALC-MCNC: 2.3 G/DL (ref 1.9–3.5)
GLUCOSE SERPL-MCNC: 96 MG/DL (ref 65–99)
HCG SERPL QL: NEGATIVE
HCT VFR BLD AUTO: 35.1 % (ref 37–47)
HGB BLD-MCNC: 11.4 G/DL (ref 12–16)
INR PPP: 1.08 (ref 0.87–1.13)
MCH RBC QN AUTO: 31 PG (ref 27–33)
MCHC RBC AUTO-ENTMCNC: 32.5 G/DL (ref 32.2–35.5)
MCV RBC AUTO: 95.4 FL (ref 81.4–97.8)
PLATELET # BLD AUTO: 187 K/UL (ref 164–446)
PMV BLD AUTO: 9.4 FL (ref 9–12.9)
POTASSIUM SERPL-SCNC: 3.5 MMOL/L (ref 3.6–5.5)
PROT SERPL-MCNC: 5.6 G/DL (ref 6–8.2)
PROTHROMBIN TIME: 14.2 SEC (ref 12–14.6)
RBC # BLD AUTO: 3.68 M/UL (ref 4.2–5.4)
RH BLD: NORMAL
SODIUM SERPL-SCNC: 139 MMOL/L (ref 135–145)
WBC # BLD AUTO: 6.5 K/UL (ref 4.8–10.8)

## 2024-07-07 PROCEDURE — 85730 THROMBOPLASTIN TIME PARTIAL: CPT

## 2024-07-07 PROCEDURE — 85027 COMPLETE CBC AUTOMATED: CPT

## 2024-07-07 PROCEDURE — 305949 HCHG RED TRAUMA ACT PRE-NOTIFY NO CC

## 2024-07-07 PROCEDURE — 82077 ASSAY SPEC XCP UR&BREATH IA: CPT

## 2024-07-07 PROCEDURE — 700117 HCHG RX CONTRAST REV CODE 255: Performed by: EMERGENCY MEDICINE

## 2024-07-07 PROCEDURE — 86900 BLOOD TYPING SEROLOGIC ABO: CPT

## 2024-07-07 PROCEDURE — 86850 RBC ANTIBODY SCREEN: CPT

## 2024-07-07 PROCEDURE — 85610 PROTHROMBIN TIME: CPT

## 2024-07-07 PROCEDURE — 86901 BLOOD TYPING SEROLOGIC RH(D): CPT

## 2024-07-07 PROCEDURE — 72170 X-RAY EXAM OF PELVIS: CPT

## 2024-07-07 PROCEDURE — 70450 CT HEAD/BRAIN W/O DYE: CPT

## 2024-07-07 PROCEDURE — 71045 X-RAY EXAM CHEST 1 VIEW: CPT

## 2024-07-07 PROCEDURE — 96375 TX/PRO/DX INJ NEW DRUG ADDON: CPT

## 2024-07-07 PROCEDURE — 80053 COMPREHEN METABOLIC PANEL: CPT

## 2024-07-07 PROCEDURE — 99285 EMERGENCY DEPT VISIT HI MDM: CPT

## 2024-07-07 PROCEDURE — 72125 CT NECK SPINE W/O DYE: CPT

## 2024-07-07 PROCEDURE — 72128 CT CHEST SPINE W/O DYE: CPT

## 2024-07-07 PROCEDURE — 72131 CT LUMBAR SPINE W/O DYE: CPT

## 2024-07-07 PROCEDURE — 84703 CHORIONIC GONADOTROPIN ASSAY: CPT

## 2024-07-07 PROCEDURE — 99284 EMERGENCY DEPT VISIT MOD MDM: CPT | Performed by: STUDENT IN AN ORGANIZED HEALTH CARE EDUCATION/TRAINING PROGRAM

## 2024-07-07 PROCEDURE — 36415 COLL VENOUS BLD VENIPUNCTURE: CPT

## 2024-07-07 PROCEDURE — 96374 THER/PROPH/DIAG INJ IV PUSH: CPT

## 2024-07-07 PROCEDURE — 71260 CT THORAX DX C+: CPT

## 2024-07-07 PROCEDURE — 700111 HCHG RX REV CODE 636 W/ 250 OVERRIDE (IP): Performed by: EMERGENCY MEDICINE

## 2024-07-07 RX ORDER — CYCLOBENZAPRINE HCL 5 MG
5-10 TABLET ORAL 3 TIMES DAILY PRN
Qty: 20 TABLET | Refills: 0 | Status: SHIPPED | OUTPATIENT
Start: 2024-07-07

## 2024-07-07 RX ORDER — LORAZEPAM 2 MG/ML
INJECTION INTRAMUSCULAR
Status: COMPLETED | OUTPATIENT
Start: 2024-07-07 | End: 2024-07-07

## 2024-07-07 RX ORDER — KETOROLAC TROMETHAMINE 15 MG/ML
15 INJECTION, SOLUTION INTRAMUSCULAR; INTRAVENOUS ONCE
Status: COMPLETED | OUTPATIENT
Start: 2024-07-07 | End: 2024-07-07

## 2024-07-07 RX ADMIN — IOHEXOL 100 ML: 350 INJECTION, SOLUTION INTRAVENOUS at 18:30

## 2024-07-07 RX ADMIN — KETOROLAC TROMETHAMINE 15 MG: 15 INJECTION, SOLUTION INTRAMUSCULAR; INTRAVENOUS at 19:30

## 2024-07-07 RX ADMIN — LORAZEPAM 2 MG: 2 INJECTION INTRAMUSCULAR; INTRAVENOUS at 17:41

## 2024-09-12 ENCOUNTER — APPOINTMENT (OUTPATIENT)
Dept: URGENT CARE | Facility: PHYSICIAN GROUP | Age: 59
End: 2024-09-12
Payer: MEDICAID

## 2024-09-12 ENCOUNTER — TELEPHONE (OUTPATIENT)
Dept: HEALTH INFORMATION MANAGEMENT | Facility: OTHER | Age: 59
End: 2024-09-12
Payer: MEDICAID

## 2024-09-17 ENCOUNTER — OFFICE VISIT (OUTPATIENT)
Dept: URGENT CARE | Facility: PHYSICIAN GROUP | Age: 59
End: 2024-09-17
Payer: MEDICAID

## 2024-09-17 VITALS
SYSTOLIC BLOOD PRESSURE: 122 MMHG | RESPIRATION RATE: 16 BRPM | OXYGEN SATURATION: 95 % | DIASTOLIC BLOOD PRESSURE: 72 MMHG | TEMPERATURE: 97.5 F | HEART RATE: 95 BPM

## 2024-09-17 DIAGNOSIS — J01.90 ACUTE BACTERIAL SINUSITIS: ICD-10-CM

## 2024-09-17 DIAGNOSIS — J02.9 ACUTE PHARYNGITIS, UNSPECIFIED ETIOLOGY: ICD-10-CM

## 2024-09-17 DIAGNOSIS — R52 BODY ACHES: ICD-10-CM

## 2024-09-17 DIAGNOSIS — K13.79 ORAL MASS: ICD-10-CM

## 2024-09-17 DIAGNOSIS — B96.89 ACUTE BACTERIAL SINUSITIS: ICD-10-CM

## 2024-09-17 LAB
FLUAV RNA SPEC QL NAA+PROBE: NEGATIVE
FLUBV RNA SPEC QL NAA+PROBE: NEGATIVE
RSV RNA SPEC QL NAA+PROBE: NEGATIVE
S PYO DNA SPEC NAA+PROBE: NOT DETECTED
SARS-COV-2 RNA RESP QL NAA+PROBE: NEGATIVE

## 2024-09-17 PROCEDURE — 99203 OFFICE O/P NEW LOW 30 MIN: CPT | Performed by: NURSE PRACTITIONER

## 2024-09-17 PROCEDURE — 87651 STREP A DNA AMP PROBE: CPT | Performed by: NURSE PRACTITIONER

## 2024-09-17 PROCEDURE — 3074F SYST BP LT 130 MM HG: CPT | Performed by: NURSE PRACTITIONER

## 2024-09-17 PROCEDURE — 3078F DIAST BP <80 MM HG: CPT | Performed by: NURSE PRACTITIONER

## 2024-09-17 PROCEDURE — 87637 SARSCOV2&INF A&B&RSV AMP PRB: CPT | Mod: QW | Performed by: NURSE PRACTITIONER

## 2024-09-17 RX ORDER — PREDNISONE 10 MG/1
40 TABLET ORAL DAILY
Qty: 20 TABLET | Refills: 0 | Status: SHIPPED | OUTPATIENT
Start: 2024-09-17 | End: 2024-09-22

## 2024-09-17 RX ORDER — ATENOLOL 25 MG/1
25 TABLET ORAL DAILY
COMMUNITY
End: 2024-09-17

## 2024-09-17 RX ORDER — AZITHROMYCIN 250 MG/1
TABLET, FILM COATED ORAL
Qty: 6 TABLET | Refills: 0 | Status: SHIPPED | OUTPATIENT
Start: 2024-09-17

## 2024-09-17 NOTE — PROGRESS NOTES
Subjective:   Wally Dwyer is a 59 y.o. female who presents for Cough (15 days Cough, body aches, SOB, vomiting, diarrhea, sore throat, )    Patient is a 59-year-old female presenting clinic today stating 15-day history of cough with clear to white color phlegm, body aches, headaches, sore throat, nasal congestion, sinus pain and pressure, and vomiting and diarrhea.  Patient has been able to tolerate fluids.    Patient denies any fevers, shortness of breath on exertion, chest pain, or palpitations.  Has been using her albuterol which is helping with her asthma symptoms.  No over-the-counter medications have been tried at this time.  She states she did have a COVID contact about 2 weeks ago.  Patient does report prior history of COPD and asthma.  Patient does continue to smoke cigarettes.    Medications, Allergies, and current problem list reviewed today in Epic.     Objective:     /72   Pulse 95   Temp 36.4 °C (97.5 °F) (Temporal)   Resp 16   SpO2 95%     Physical Exam  Vitals reviewed.   Constitutional:       General: She is not in acute distress.     Appearance: Normal appearance. She is not ill-appearing or toxic-appearing.   HENT:      Head: Normocephalic.      Right Ear: Tympanic membrane, ear canal and external ear normal.      Left Ear: Tympanic membrane and ear canal normal.      Nose: Congestion and rhinorrhea present.      Mouth/Throat:      Mouth: Mucous membranes are moist. No oral lesions.      Palate: Lesions present.      Pharynx: Oropharynx is clear. Uvula midline. Posterior oropharyngeal erythema present. No pharyngeal swelling, oropharyngeal exudate or uvula swelling.      Tonsils: No tonsillar exudate.        Comments: Small flesh-colored oral lesion/mass on soft palate/uvula.  Eyes:      Extraocular Movements: Extraocular movements intact.      Conjunctiva/sclera: Conjunctivae normal.      Pupils: Pupils are equal, round, and reactive to light.   Cardiovascular:      Rate  and Rhythm: Normal rate and regular rhythm.   Pulmonary:      Effort: Pulmonary effort is normal.      Breath sounds: Normal breath sounds.   Abdominal:      General: Abdomen is flat. Bowel sounds are normal. There is no distension.      Palpations: Abdomen is soft. There is no mass.      Tenderness: There is no abdominal tenderness. There is no guarding or rebound.   Musculoskeletal:         General: Normal range of motion.      Cervical back: Normal range of motion and neck supple.   Lymphadenopathy:      Cervical: No cervical adenopathy.   Skin:     General: Skin is warm and dry.      Findings: No rash.   Neurological:      Mental Status: She is alert and oriented to person, place, and time.   Psychiatric:         Mood and Affect: Mood normal.         Behavior: Behavior normal.         Thought Content: Thought content normal.         Judgment: Judgment normal.       Results for orders placed or performed in visit on 09/17/24   POCT Cepheid CoV-2, Flu A/B, RSV - PCR   Result Value Ref Range    SARS-CoV-2 by PCR Negative Negative, Invalid    Influenza virus A RNA Negative Negative, Invalid    Influenza virus B, PCR Negative Negative, Invalid    RSV, PCR Negative Negative, Invalid   POCT CEPHEID GROUP A STREP - PCR   Result Value Ref Range    POC Group A Strep, PCR Not Detected Not Detected, Invalid         Assessment/Plan:     Diagnosis and associated orders:     1. Acute bacterial sinusitis  predniSONE (DELTASONE) 10 MG Tab    azithromycin (ZITHROMAX) 250 MG Tab      2. Acute pharyngitis, unspecified etiology  POCT CEPHEID GROUP A STREP - PCR      3. Body aches  POCT Cepheid CoV-2, Flu A/B, RSV - PCR    POCT CEPHEID GROUP A STREP - PCR    CANCELED: POCT Cepheid CoV-2, Flu A/B, RSV - PCR      4. Oral mass  Referral to ENT         Comments/MDM:     POCT COVID, influenza, RSV, strep all negative.  Patient was given test results in clinic.    1. Acute bacterial sinusitis  Provided patient with information on the  etiology & pathogenesis of bacterial sinusitis.   Recommend cool mist humidifier at home, use nasal saline wash, may take OTC decongestant prn, and antibiotics as prescribed.   Tylenol/Motrin prn HA or discomfort.   Return to clinic for fever >4d, no improvement within 48-72h, or for any other questions or concerns.   - predniSONE (DELTASONE) 10 MG Tab; Take 4 Tablets by mouth every day for 5 days.  Dispense: 20 Tablet; Refill: 0  - azithromycin (ZITHROMAX) 250 MG Tab; Take 2 tablets by mouth on day one. Take one tablet by mouth the remaining days until gone  Dispense: 6 Tablet; Refill: 0    3. Acute pharyngitis, unspecified etiology  Most likely secondary to sinusitis.  Negative strep test in clinic.  - POCT CEPHEID GROUP A STREP - PCR    4. Body aches  - POCT Cepheid CoV-2, Flu A/B, RSV - PCR  - POCT CEPHEID GROUP A STREP - PCR    5. Oral mass  This is a new condition.  Current prognosis unknown.  Patient is a chronic smoker.  Discussed with patient that I am concerned for possible malignancy.  Stat referral placed to ENT.  - Referral to ENT      Patient was involved with shared decision-making throughout the exam today and verbalizes understanding regards to plan of care, discharge instructions, and follow-up         Differential diagnosis, natural history, supportive care, and indications for immediate follow-up discussed.    Advised the patient to follow-up with the primary care physician for recheck, reevaluation, and consideration of further management.    I personally reviewed prior external notes and test results pertinent to today's visit as well as additional imaging and testing completed in clinic today.     Please note that this dictation was created using voice recognition software. I have made a reasonable attempt to correct obvious errors, but I expect that there are errors of grammar and possibly content that I did not discover before finalizing the note.

## 2024-09-19 ENCOUNTER — TELEPHONE (OUTPATIENT)
Dept: CARDIOLOGY | Facility: MEDICAL CENTER | Age: 59
End: 2024-09-19
Payer: MEDICAID

## 2024-09-19 NOTE — TELEPHONE ENCOUNTER
Called patient to request records for NP appointment withDr.To. Called to confirm if this will be first time patient is seeing a cardiologist. No answer, left voicemail to call back.

## 2024-09-30 ENCOUNTER — OFFICE VISIT (OUTPATIENT)
Dept: CARDIOLOGY | Facility: MEDICAL CENTER | Age: 59
End: 2024-09-30
Attending: INTERNAL MEDICINE
Payer: MEDICAID

## 2024-09-30 VITALS
RESPIRATION RATE: 16 BRPM | SYSTOLIC BLOOD PRESSURE: 108 MMHG | HEART RATE: 86 BPM | OXYGEN SATURATION: 96 % | BODY MASS INDEX: 26.2 KG/M2 | DIASTOLIC BLOOD PRESSURE: 68 MMHG | HEIGHT: 70 IN | WEIGHT: 183 LBS

## 2024-09-30 DIAGNOSIS — I83.813 VARICOSE VEINS OF BOTH LOWER EXTREMITIES WITH PAIN: ICD-10-CM

## 2024-09-30 DIAGNOSIS — Z86.718 HISTORY OF DEEP VENOUS THROMBOSIS: ICD-10-CM

## 2024-09-30 DIAGNOSIS — R06.09 DYSPNEA ON EXERTION: ICD-10-CM

## 2024-09-30 DIAGNOSIS — I10 HTN (HYPERTENSION), MALIGNANT: ICD-10-CM

## 2024-09-30 DIAGNOSIS — Z86.711 HISTORY OF PULMONARY EMBOLISM: ICD-10-CM

## 2024-09-30 PROCEDURE — 99212 OFFICE O/P EST SF 10 MIN: CPT | Performed by: INTERNAL MEDICINE

## 2024-09-30 RX ORDER — GABAPENTIN 600 MG/1
600 TABLET ORAL 3 TIMES DAILY
COMMUNITY

## 2024-09-30 ASSESSMENT — ENCOUNTER SYMPTOMS
SENSORY CHANGE: 0
FALLS: 0
DIZZINESS: 0
COUGH: 0
BLURRED VISION: 0
DOUBLE VISION: 0
MYALGIAS: 0
ABDOMINAL PAIN: 0
SHORTNESS OF BREATH: 1
DEPRESSION: 0
FEVER: 0
HEADACHES: 0
DIAPHORESIS: 0
BRUISES/BLEEDS EASILY: 0
MEMORY LOSS: 0
PALPITATIONS: 0

## 2024-09-30 ASSESSMENT — FIBROSIS 4 INDEX: FIB4 SCORE: 2.2
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
## 2024-12-11 ENCOUNTER — TELEPHONE (OUTPATIENT)
Dept: CARDIOLOGY | Facility: MEDICAL CENTER | Age: 59
End: 2024-12-11
Payer: MEDICAID

## 2024-12-11 NOTE — TELEPHONE ENCOUNTER
Attempted to called patient in regards to her labs that were order on her last visit no answer and could not leave message for patient VM not set up.

## 2025-01-07 ENCOUNTER — TELEPHONE (OUTPATIENT)
Dept: CARDIOLOGY | Facility: MEDICAL CENTER | Age: 60
End: 2025-01-07
Payer: MEDICAID